# Patient Record
Sex: FEMALE | Race: WHITE | NOT HISPANIC OR LATINO | Employment: FULL TIME | URBAN - METROPOLITAN AREA
[De-identification: names, ages, dates, MRNs, and addresses within clinical notes are randomized per-mention and may not be internally consistent; named-entity substitution may affect disease eponyms.]

---

## 2021-01-22 ENCOUNTER — OFFICE VISIT (OUTPATIENT)
Dept: FAMILY MEDICINE | Facility: CLINIC | Age: 36
End: 2021-01-22
Payer: COMMERCIAL

## 2021-01-22 VITALS
DIASTOLIC BLOOD PRESSURE: 82 MMHG | OXYGEN SATURATION: 97 % | TEMPERATURE: 97.9 F | BODY MASS INDEX: 24.23 KG/M2 | SYSTOLIC BLOOD PRESSURE: 125 MMHG | HEIGHT: 67 IN | WEIGHT: 154.4 LBS | HEART RATE: 86 BPM | RESPIRATION RATE: 15 BRPM

## 2021-01-22 DIAGNOSIS — Z80.3 FAMILY HISTORY OF MALIGNANT NEOPLASM OF BREAST: ICD-10-CM

## 2021-01-22 DIAGNOSIS — Z00.00 ROUTINE GENERAL MEDICAL EXAMINATION AT A HEALTH CARE FACILITY: Primary | ICD-10-CM

## 2021-01-22 DIAGNOSIS — R30.0 DYSURIA: ICD-10-CM

## 2021-01-22 DIAGNOSIS — Z11.3 SCREEN FOR STD (SEXUALLY TRANSMITTED DISEASE): ICD-10-CM

## 2021-01-22 DIAGNOSIS — H91.93 DECREASED HEARING OF BOTH EARS: ICD-10-CM

## 2021-01-22 LAB
ALBUMIN UR-MCNC: NEGATIVE MG/DL
APPEARANCE UR: CLEAR
BILIRUB UR QL STRIP: NEGATIVE
COLOR UR AUTO: YELLOW
GLUCOSE UR STRIP-MCNC: NEGATIVE MG/DL
HCV AB SERPL QL IA: NONREACTIVE
HGB UR QL STRIP: ABNORMAL
HIV 1+2 AB+HIV1 P24 AG SERPL QL IA: NONREACTIVE
KETONES UR STRIP-MCNC: NEGATIVE MG/DL
LEUKOCYTE ESTERASE UR QL STRIP: NEGATIVE
NITRATE UR QL: NEGATIVE
NON-SQ EPI CELLS #/AREA URNS LPF: ABNORMAL /LPF
PH UR STRIP: 7 PH (ref 5–7)
RBC #/AREA URNS AUTO: ABNORMAL /HPF
SOURCE: ABNORMAL
SP GR UR STRIP: 1.01 (ref 1–1.03)
UROBILINOGEN UR STRIP-ACNC: 0.2 EU/DL (ref 0.2–1)
WBC #/AREA URNS AUTO: ABNORMAL /HPF

## 2021-01-22 PROCEDURE — 87591 N.GONORRHOEAE DNA AMP PROB: CPT | Performed by: NURSE PRACTITIONER

## 2021-01-22 PROCEDURE — 99385 PREV VISIT NEW AGE 18-39: CPT | Performed by: NURSE PRACTITIONER

## 2021-01-22 PROCEDURE — 86803 HEPATITIS C AB TEST: CPT | Performed by: NURSE PRACTITIONER

## 2021-01-22 PROCEDURE — 81001 URINALYSIS AUTO W/SCOPE: CPT | Performed by: NURSE PRACTITIONER

## 2021-01-22 PROCEDURE — 99214 OFFICE O/P EST MOD 30 MIN: CPT | Mod: 25 | Performed by: NURSE PRACTITIONER

## 2021-01-22 PROCEDURE — 87389 HIV-1 AG W/HIV-1&-2 AB AG IA: CPT | Performed by: NURSE PRACTITIONER

## 2021-01-22 PROCEDURE — 36415 COLL VENOUS BLD VENIPUNCTURE: CPT | Performed by: NURSE PRACTITIONER

## 2021-01-22 PROCEDURE — 87491 CHLMYD TRACH DNA AMP PROBE: CPT | Performed by: NURSE PRACTITIONER

## 2021-01-22 SDOH — HEALTH STABILITY: MENTAL HEALTH: HOW OFTEN DO YOU HAVE A DRINK CONTAINING ALCOHOL?: NEVER

## 2021-01-22 ASSESSMENT — ENCOUNTER SYMPTOMS
JOINT SWELLING: 0
DIZZINESS: 0
HEADACHES: 0
COUGH: 0
FREQUENCY: 1
HEMATURIA: 0
NERVOUS/ANXIOUS: 0
ARTHRALGIAS: 0
CONSTIPATION: 0
FEVER: 0
BREAST MASS: 0
HEMATOCHEZIA: 0
ABDOMINAL PAIN: 0
SORE THROAT: 0
CHILLS: 0
SHORTNESS OF BREATH: 0
NAUSEA: 0
HEARTBURN: 0
PALPITATIONS: 0
EYE PAIN: 0
WEAKNESS: 0
MYALGIAS: 0
PARESTHESIAS: 1
DIARRHEA: 0
DYSURIA: 0

## 2021-01-22 ASSESSMENT — MIFFLIN-ST. JEOR: SCORE: 1414.01

## 2021-01-22 NOTE — PROGRESS NOTES
SUBJECTIVE:   CC: Анна Alexander is an 35 year old woman who presents for preventive health visit.       Patient has been advised of split billing requirements and indicates understanding: Yes  Healthy Habits:     Getting at least 3 servings of Calcium per day:  Yes    Bi-annual eye exam:  Yes    Dental care twice a year:  Yes    Sleep apnea or symptoms of sleep apnea:  None    Diet:  Regular (no restrictions)    Frequency of exercise:  4-5 days/week    Duration of exercise:  15-30 minutes    Taking medications regularly:  Not Applicable    Medication side effects:  Not applicable    PHQ-2 Total Score: 0    Additional concerns today:  Yes    2. mom and mgm with early breast cancer  New right breast itching x 1 month    Referral for GYN, wants PAP, OCP consult and pelvic exam    Muffled hearing x 2-3 months - no pain or discharge.  Allergies?              Today's PHQ-2 Score:   PHQ-2 ( 1999 Pfizer) 1/22/2021   Q1: Little interest or pleasure in doing things 0   Q2: Feeling down, depressed or hopeless 0   PHQ-2 Score 0   Q1: Little interest or pleasure in doing things Not at all   Q2: Feeling down, depressed or hopeless Not at all   PHQ-2 Score 0       Abuse: Current or Past (Physical, Sexual or Emotional) - No  Do you feel safe in your environment? Yes        Social History     Tobacco Use     Smoking status: Never Smoker     Smokeless tobacco: Never Used   Substance Use Topics     Alcohol use: Never     Frequency: Never     If you drink alcohol do you typically have >3 drinks per day or >7 drinks per week? Yes      Alcohol Use 1/22/2021   Prescreen: >3 drinks/day or >7 drinks/week? Yes   AUDIT SCORE  7     AUDIT - Alcohol Use Disorders Identification Test - Reproduced from the World Health Organization Audit 2001 (Second Edition) 1/22/2021   1.  How often do you have a drink containing alcohol? 2 to 3 times a week   2.  How many drinks containing alcohol do you have on a typical day when you are drinking? 3 or 4    3.  How often do you have five or more drinks on one occasion? Weekly   4.  How often during the last year have you found that you were not able to stop drinking once you had started? Never   5.  How often during the last year have you failed to do what was normally expected of you because of drinking? Never   6.  How often during the last year have you needed a first drink in the morning to get yourself going after a heavy drinking session? Never   7.  How often during the last year have you had a feeling of guilt or remorse after drinking? Never   8.  How often during the last year have you been unable to remember what happened the night before because of your drinking? Never   9.  Have you or someone else been injured because of your drinking? No   10. Has a relative, friend, doctor or other health care worker been concerned about your drinking or suggested you cut down? No   TOTAL SCORE 7       Any new diagnosis of family breast, ovarian, or bowel cancer? No  - not new    Reviewed orders with patient.  Reviewed health maintenance and updated orders accordingly - Yes  Lab work is in process    Breast CA Risk Screening:  No flowsheet data found.    Mammogram Screening - Offered annual screening and updated Health Maintenance for mutual plan based on risk factor consideration    Pertinent mammograms are reviewed under the imaging tab.    History of abnormal Pap smear: NO - age 30-65 PAP every 5 years with negative HPV co-testing recommended     Reviewed and updated as needed this visit by clinical staff  Tobacco  Allergies  Meds   Med Hx  Surg Hx  Fam Hx  Soc Hx        Reviewed and updated as needed this visit by Provider                  Review of Systems   Constitutional: Negative for chills and fever.   HENT: Positive for hearing loss. Negative for congestion, ear pain and sore throat.    Eyes: Negative for pain and visual disturbance.   Respiratory: Negative for cough and shortness of breath.   "  Cardiovascular: Negative for chest pain, palpitations and peripheral edema.   Gastrointestinal: Negative for abdominal pain, constipation, diarrhea, heartburn, hematochezia and nausea.   Breasts:  Negative for tenderness, breast mass and discharge.   Genitourinary: Positive for frequency and urgency. Negative for dysuria, genital sores, hematuria, pelvic pain, vaginal bleeding and vaginal discharge.   Musculoskeletal: Negative for arthralgias, joint swelling and myalgias.   Skin: Negative for rash.   Neurological: Positive for paresthesias. Negative for dizziness, weakness and headaches.   Psychiatric/Behavioral: Negative for mood changes. The patient is not nervous/anxious.         OBJECTIVE:   /82 (BP Location: Left arm, Patient Position: Chair, Cuff Size: Adult Regular)   Pulse 86   Temp 97.9  F (36.6  C) (Tympanic)   Resp 15   Ht 1.695 m (5' 6.75\")   Wt 70 kg (154 lb 6.4 oz)   LMP 12/22/2020 (Approximate)   SpO2 97%   BMI 24.36 kg/m    Physical Exam  GENERAL: healthy, alert and no distress  EYES: Eyes grossly normal to inspection, PERRL and conjunctivae and sclerae normal  HENT: ear canals and TM's normal, nose and mouth without ulcers or lesions  NECK: no adenopathy, no asymmetry, masses, or scars and thyroid normal to palpation  RESP: lungs clear to auscultation - no rales, rhonchi or wheezes  CV: regular rate and rhythm, normal S1 S2, no S3 or S4, no murmur, click or rub, no peripheral edema and peripheral pulses strong  ABDOMEN: soft, nontender, no hepatosplenomegaly, no masses and bowel sounds normal  MS: no gross musculoskeletal defects noted, no edema  SKIN: no suspicious lesions or rashes  NEURO: Normal strength and tone, mentation intact and speech normal  PSYCH: mentation appears normal, affect normal/bright  Results for orders placed or performed in visit on 01/22/21   Hepatitis C antibody     Status: None   Result Value Ref Range    Hepatitis C Antibody Nonreactive NR^Nonreactive "   HIV Antigen Antibody Combo     Status: None   Result Value Ref Range    HIV Antigen Antibody Combo Nonreactive NR^Nonreactive       UA with Microscopic reflex to Culture     Status: Abnormal    Specimen: Midstream Urine   Result Value Ref Range    Color Urine Yellow     Appearance Urine Clear     Glucose Urine Negative NEG^Negative mg/dL    Bilirubin Urine Negative NEG^Negative    Ketones Urine Negative NEG^Negative mg/dL    Specific Gravity Urine 1.015 1.003 - 1.035    pH Urine 7.0 5.0 - 7.0 pH    Protein Albumin Urine Negative NEG^Negative mg/dL    Urobilinogen Urine 0.2 0.2 - 1.0 EU/dL    Nitrite Urine Negative NEG^Negative    Blood Urine Moderate (A) NEG^Negative    Leukocyte Esterase Urine Negative NEG^Negative    Source Midstream Urine     WBC Urine 0 - 5 OTO5^0 - 5 /HPF    RBC Urine 10-25 (A) OTO2^O - 2 /HPF    Squamous Epithelial /LPF Urine Few FEW^Few /LPF   NEISSERIA GONORRHOEA PCR     Status: None    Specimen: Urine   Result Value Ref Range    Specimen Descrip Urine     N Gonorrhea PCR Negative NEG^Negative   CHLAMYDIA TRACHOMATIS PCR     Status: None    Specimen: Urine   Result Value Ref Range    Specimen Description Urine     Chlamydia Trachomatis PCR Negative NEG^Negative         ASSESSMENT/PLAN:       ICD-10-CM    1. Routine general medical examination at a health care facility  Z00.00    2. Family history of malignant neoplasm of breast  Z80.3 *MA Screening Digital Bilateral     OB/GYN REFERRAL     OFFICE/OUTPT VISIT,EST,LEVL IV   3. Dysuria  R30.0 OFFICE/OUTPT VISIT,EST,LEVL IV     UA with Microscopic reflex to Culture   4. Decreased hearing of both ears  H91.93 OFFICE/OUTPT VISIT,EST,LEVL IV   5. Screen for STD (sexually transmitted disease)  Z11.3 NEISSERIA GONORRHOEA PCR     CHLAMYDIA TRACHOMATIS PCR     Hepatitis C antibody     HIV Antigen Antibody Combo      well female, not fasting for labs.  Encouraged to continue exercise and healthy diet choices.      Refer to GYN for pap, OCP options and  "family history of early breast cancer in mom and MGM.     Patient has been advised of split billing requirements and indicates understanding: Yes  COUNSELING:  Reviewed preventive health counseling, as reflected in patient instructions    Estimated body mass index is 24.36 kg/m  as calculated from the following:    Height as of this encounter: 1.695 m (5' 6.75\").    Weight as of this encounter: 70 kg (154 lb 6.4 oz).      She reports that she has never smoked. She has never used smokeless tobacco.      Counseling Resources:  ATP IV Guidelines  Pooled Cohorts Equation Calculator  Breast Cancer Risk Calculator  BRCA-Related Cancer Risk Assessment: FHS-7 Tool  FRAX Risk Assessment  ICSI Preventive Guidelines  Dietary Guidelines for Americans, 2010  USDA's MyPlate  ASA Prophylaxis  Lung CA Screening    MARCK [facility]  "

## 2021-01-23 LAB
C TRACH DNA SPEC QL NAA+PROBE: NEGATIVE
N GONORRHOEA DNA SPEC QL NAA+PROBE: NEGATIVE
SPECIMEN SOURCE: NORMAL
SPECIMEN SOURCE: NORMAL

## 2021-02-02 ENCOUNTER — ANCILLARY PROCEDURE (OUTPATIENT)
Dept: GENERAL RADIOLOGY | Facility: CLINIC | Age: 36
End: 2021-02-02
Attending: FAMILY MEDICINE
Payer: COMMERCIAL

## 2021-02-02 ENCOUNTER — ANCILLARY PROCEDURE (OUTPATIENT)
Dept: MAMMOGRAPHY | Facility: CLINIC | Age: 36
End: 2021-02-02
Attending: NURSE PRACTITIONER
Payer: COMMERCIAL

## 2021-02-02 ENCOUNTER — OFFICE VISIT (OUTPATIENT)
Dept: URGENT CARE | Facility: URGENT CARE | Age: 36
End: 2021-02-02
Payer: COMMERCIAL

## 2021-02-02 VITALS
DIASTOLIC BLOOD PRESSURE: 76 MMHG | TEMPERATURE: 98.2 F | WEIGHT: 155 LBS | OXYGEN SATURATION: 100 % | BODY MASS INDEX: 24.46 KG/M2 | HEART RATE: 91 BPM | SYSTOLIC BLOOD PRESSURE: 140 MMHG

## 2021-02-02 DIAGNOSIS — S39.92XA TAILBONE INJURY, INITIAL ENCOUNTER: Primary | ICD-10-CM

## 2021-02-02 DIAGNOSIS — Z80.3 FAMILY HISTORY OF MALIGNANT NEOPLASM OF BREAST: ICD-10-CM

## 2021-02-02 DIAGNOSIS — S39.92XA TAILBONE INJURY, INITIAL ENCOUNTER: ICD-10-CM

## 2021-02-02 PROCEDURE — 72220 X-RAY EXAM SACRUM TAILBONE: CPT | Performed by: RADIOLOGY

## 2021-02-02 PROCEDURE — 77067 SCR MAMMO BI INCL CAD: CPT | Mod: TC | Performed by: RADIOLOGY

## 2021-02-02 PROCEDURE — 77063 BREAST TOMOSYNTHESIS BI: CPT | Mod: TC | Performed by: RADIOLOGY

## 2021-02-02 PROCEDURE — 99213 OFFICE O/P EST LOW 20 MIN: CPT | Performed by: FAMILY MEDICINE

## 2021-02-02 RX ORDER — DICLOFENAC SODIUM 75 MG/1
75 TABLET, DELAYED RELEASE ORAL 2 TIMES DAILY
Qty: 14 TABLET | Refills: 0 | Status: SHIPPED | OUTPATIENT
Start: 2021-02-02 | End: 2022-03-22

## 2021-02-02 NOTE — PATIENT INSTRUCTIONS
Diclofenac 75 mg twice daily for the next week.  Take this even if you are not feeling pain.    Continue to ice the area.  Use arnica gel or arnica tablets orally.  If you can find lavender essential oil, you can also use that topically.

## 2021-02-02 NOTE — PROGRESS NOTES
Assessment & Plan     Tailbone injury, initial encounter.  No evidence of fracture per radiology reading.  No neuro complications.    Start diclofenac 75 mg BID x 7 days.  Use this to stay ahead of the pain.  Continue ice.  Can also use arnica topically or orally to help with recovery.    Limit high-impact exercise and squats for at least one week.    Follow up if not back to baseline in 2 weeks.    Sobia Booth MD  [facility] JENNIFER Jj is a 35 year old who presents to clinic today for evaluation of worsening tailbone pain after falling one week ago.  No change in bowel or bladder function.  Pain worse with changing from sitting to standing and with bending over.  Painful at night when lying down.  Has been using ice.  Has also tried ibuprofen 400 mg 1 dose per day as needed without much relief.      Objective    BP (!) 140/76   Pulse 91   Temp 98.2  F (36.8  C) (Tympanic)   Wt 70.3 kg (155 lb)   SpO2 100%   BMI 24.46 kg/m    Body mass index is 24.46 kg/m .   Well-appearing, sitting perched on edge of chair and leaning forward.      Results for orders placed or performed in visit on 02/02/21   XR Sacrum and Coccyx 2 Views     Status: None    Narrative    XR SACRUM AND COCCYX 2 VW 2/2/2021 10:13 AM     HISTORY: Fell 1 week ago, worsening tailbone pain.  r/o fracture.;  Tailbone injury, initial encounter      Impression    IMPRESSION: Iliac sclerosis adjacent to the sacral iliac joints which  may be degenerative. No apparent periarticular erosions indicate  sacroiliitis. Sacrum and coccyx appear grossly intact.    LINDA ROJAS MD

## 2021-02-11 ENCOUNTER — OFFICE VISIT (OUTPATIENT)
Dept: OBGYN | Facility: CLINIC | Age: 36
End: 2021-02-11
Payer: COMMERCIAL

## 2021-02-11 VITALS
SYSTOLIC BLOOD PRESSURE: 142 MMHG | HEIGHT: 67 IN | DIASTOLIC BLOOD PRESSURE: 78 MMHG | TEMPERATURE: 98 F | BODY MASS INDEX: 24.01 KG/M2 | HEART RATE: 97 BPM | WEIGHT: 153 LBS

## 2021-02-11 DIAGNOSIS — R35.0 URINARY FREQUENCY: ICD-10-CM

## 2021-02-11 DIAGNOSIS — Z30.017 NEXPLANON INSERTION: ICD-10-CM

## 2021-02-11 DIAGNOSIS — N39.3 SUI (STRESS URINARY INCONTINENCE, FEMALE): ICD-10-CM

## 2021-02-11 DIAGNOSIS — Z12.4 SCREENING FOR MALIGNANT NEOPLASM OF CERVIX: Primary | ICD-10-CM

## 2021-02-11 LAB — HCG UR QL: NEGATIVE

## 2021-02-11 PROCEDURE — G0145 SCR C/V CYTO,THINLAYER,RESCR: HCPCS | Performed by: OBSTETRICS & GYNECOLOGY

## 2021-02-11 PROCEDURE — 99202 OFFICE O/P NEW SF 15 MIN: CPT | Mod: 25 | Performed by: OBSTETRICS & GYNECOLOGY

## 2021-02-11 PROCEDURE — 81025 URINE PREGNANCY TEST: CPT | Performed by: OBSTETRICS & GYNECOLOGY

## 2021-02-11 PROCEDURE — 11981 INSERTION DRUG DLVR IMPLANT: CPT | Performed by: OBSTETRICS & GYNECOLOGY

## 2021-02-11 PROCEDURE — 87624 HPV HI-RISK TYP POOLED RSLT: CPT | Performed by: OBSTETRICS & GYNECOLOGY

## 2021-02-11 ASSESSMENT — MIFFLIN-ST. JEOR: SCORE: 1403.69

## 2021-02-11 NOTE — NURSING NOTE
"Chief Complaint   Patient presents with     Gyn Exam     Incontinence     nexplanon     NDC:6439-8891-97 LOT#:[id number] EXP:2022       Initial BP (!) 142/78   Pulse 97   Temp 98  F (36.7  C) (Oral)   Ht 1.689 m (5' 6.5\")   Wt 69.4 kg (153 lb)   LMP 2021   BMI 24.32 kg/m   Estimated body mass index is 24.32 kg/m  as calculated from the following:    Height as of this encounter: 1.689 m (5' 6.5\").    Weight as of this encounter: 69.4 kg (153 lb).  BP completed using cuff size: regular    Questioned patient about current smoking habits.  Pt. has never smoked.          The following HM Due: pap smear      The following patient reported/Care Every where data was sent to:  P ABSTRACT QUALITY INITIATIVES [[id number]]        patient has appointment for today              "

## 2021-02-11 NOTE — PROGRESS NOTES
GYN Clinic Visit  Date of visit: 2021   Chief Complaint: pap, incontinence, contraception    HPI:   Анна Alexander is a 35 year old  female who presents today to discuss stress urinary incontinence and contraception. Additionally she needs a pap.    Urinary incontinence with jumping. When she goes for a run or do a work out she feels like she needs to go the bathroom as soon as she starts the work out. Has been recommended to see pelvic floor PT in the past but has not gone. Does kegels but not sure if they are helping.    Interested in contraception. Has used OCPs in the past. Discussed options, she is interested in Nexplanon today. Patient's last menstrual period was 2021.     Obstetric History:   OB History    Para Term  AB Living   2 2 2 0 0 2   SAB TAB Ectopic Multiple Live Births   0 0 0 0 2      # Outcome Date GA Lbr Eugenio/2nd Weight Sex Delivery Anes PTL Lv   2 Term     F Vag-Spont   ANTHONY   1 Term     M Vag-Spont   ANTHONY      x2    Past Medical History:  History reviewed. No pertinent past medical history.    Past Surgical History:  History reviewed. No pertinent surgical history.      Medications:  Current Outpatient Medications   Medication     diclofenac (VOLTAREN) 75 MG EC tablet     No current facility-administered medications for this visit.        Allergy:  Allergies   Allergen Reactions     Cetirizine      Patient denies food, latex or environmental allergies.     Social History:  Lives with 2 kids. Dating.   Social History     Tobacco Use     Smoking status: Never Smoker     Smokeless tobacco: Never Used   Substance Use Topics     Alcohol use: Never     Frequency: Never     Drug use: Never        Family History   Problem Relation Age of Onset     Breast Cancer Mother 55     Cardiovascular Mother         bicuspid aortic valve     Hypertension Father      Breast Cancer Maternal Grandmother          Physical Exam:  Vitals:    21 1313   BP: (!) 142/78   Pulse: 97  "  Temp: 98  F (36.7  C)   TempSrc: Oral   Weight: 69.4 kg (153 lb)   Height: 1.689 m (5' 6.5\")     Body mass index is 24.32 kg/m .    Gen: healthy, alert, active, no distress  CV: normal pulses, no edema  Resp: normal respiratory effort  :   - external genitalia: vulva and perineum are normal without lesion, mass or erythema  - urethra: well supported urethra, no hypermobility, normal Skenes and Bartholins.   - bladder: no tenderness, no masses  - vagina: intact, rugated mucosa without lesions or abnormal discharge. noprolapse.   - cervix: normal, no lesions or abnormal discharge. Pap smear obtained.   - uterus: normal size anteverted, no masses or tenderness  - adnexa: no masses or tenderness  - rectal: deferred       Nexplanon Insertion  Before insertion it was confirmed that Анна Alexander is not pregnant nor has any other contraindication for the use of Nexplanon (no known or suspected pregnancy, no current or past history of thrombosis or thromboembolic disorders, no liver tumors, no undiagnosed abnormal genital bleeding, no known or suspected breast cancer or progestin-sensitive cancer, and no allergic reaction to any components of nexplanon.)      She understands the benefits and risks of Nexplanon.  She is explained the most common adverse reactions reported in clinical trials were change in menstrual bleeding pattern, headache, vaginitis, weight increase, acne, breast pain, abdominal pain, and pharyngitis. The patient has received a copy of the Patient Labeling included in packaging. Consent and been reviewed and completed. Patient has no allergies to the antiseptic and anesthetic to be used during insertion.      A single NEXPLANON implant was inserted subdermally in the upper left side arm.     The patient lied on her back on the exam table with her non-dominant arm flexed at the elbow and externally rotated with wrist parallel to her ear. The insertion site was identified by measuring at the inner side " of the non-dominant upper arm about 8-10cm above the medial epicondyle of the humerus. Large visible blood vessels were noted and avoided. The insertion site was cleansed with betadine.  The insertion area was anesthetized with 3 mL of  1% lidocaine.  The skin was stretched at insertion site and the nexplanon applicator inserted at 30 degrees.  The position of the implant was confirmed immediately after insertion by palpation.  Bandage placed over implant site.  Анна Alexander was able to palpate the implant herself.  Pressure bandage placed with a sterile guaze to minimize bruising.  The patient was instructed to remove the pressure dressing in 24 hours and place a normal bandage over the insertion site for 3-5 days post-insertion.  The patient tolerated the procedure well.      NDC:5889-9059-68 LOT#:[id number] EXP:2022      Assessment:  Анна Alexander is a 35 year old  who presents today to discuss stress urinary incontinence, contraception and cervical cancer screening. nexplanon inserted for contraception.    1. Screening for malignant neoplasm of cervix  - Pap imaged thin layer screen with HPV - recommended age 30 - 65 years (select HPV order below)  - HPV High Risk Types DNA Cervical    2. RANDI (stress urinary incontinence, female)  - [facility] PT, HAND, AND CHIROPRACTIC REFERRAL; Future    3. Urinary frequency  - [facility] PT, HAND, AND CHIROPRACTIC REFERRAL; Future    4. Nexplanon insertion  - HCG Qual, Urine ([id number])  - etonogestrel (NEXPLANON) subdermal implant 68 mg  - INSERTION NON-BIODEGRADABLE DRUG DELIVERY IMPLANT      Zara Borjas MD

## 2021-02-15 LAB
COPATH REPORT: NORMAL
PAP: NORMAL

## 2021-02-17 ENCOUNTER — TELEPHONE (OUTPATIENT)
Dept: OBGYN | Facility: CLINIC | Age: 36
End: 2021-02-17

## 2021-02-17 LAB
FINAL DIAGNOSIS: ABNORMAL
HPV HR 12 DNA CVX QL NAA+PROBE: POSITIVE
HPV16 DNA SPEC QL NAA+PROBE: NEGATIVE
HPV18 DNA SPEC QL NAA+PROBE: NEGATIVE
SPECIMEN DESCRIPTION: ABNORMAL
SPECIMEN SOURCE CVX/VAG CYTO: ABNORMAL

## 2021-02-17 NOTE — TELEPHONE ENCOUNTER
Patient had a Nexplanon placed on 2/11. Her arm is still feeling very sore. The insertion site itself looks fine according to paient-does not look infected. Patient does feel like the bar itself looks visible under her skin, almost like it is trying to come out of her skin. Still having a lot of bruising around the area. She is leaving for out of town tomorrow. Would you recommend she come in to have it looked at? Fanny Saab RN

## 2021-02-17 NOTE — TELEPHONE ENCOUNTER
TC to patient. She is not able to come in today. She will try ice and ibuprofen to see if that helps. Appointment scheduled with Dr. Tse tomorrow at 9 am if needed. Fanny Saab RN'

## 2021-02-18 ENCOUNTER — OFFICE VISIT (OUTPATIENT)
Dept: OBGYN | Facility: CLINIC | Age: 36
End: 2021-02-18
Payer: COMMERCIAL

## 2021-02-18 ENCOUNTER — PATIENT OUTREACH (OUTPATIENT)
Dept: OBGYN | Facility: CLINIC | Age: 36
End: 2021-02-18

## 2021-02-18 VITALS
HEIGHT: 67 IN | DIASTOLIC BLOOD PRESSURE: 84 MMHG | WEIGHT: 153 LBS | BODY MASS INDEX: 24.01 KG/M2 | SYSTOLIC BLOOD PRESSURE: 120 MMHG

## 2021-02-18 DIAGNOSIS — Z30.46 CHECKING SUBDERMAL CONTRACEPTIVE: Primary | ICD-10-CM

## 2021-02-18 PROCEDURE — 99212 OFFICE O/P EST SF 10 MIN: CPT | Performed by: OBSTETRICS & GYNECOLOGY

## 2021-02-18 ASSESSMENT — MIFFLIN-ST. JEOR: SCORE: 1403.69

## 2021-02-18 NOTE — PROGRESS NOTES
"CC:  nexplanon check  HPI:  Анна Alexander is a 35 year old female Patient's last menstrual period was 01/24/2021.  Had Nexplanon placed February 11 and lot of bruising noted.  Called yesterday as she is still having pain at site of implantation.  This pain did get better with ibuprofen and applying ice.  She just wanted to recheck and make sure it is okay.    EXAM:  Blood pressure 120/84, height 1.689 m (5' 6.5\"), weight 69.4 kg (153 lb), last menstrual period 01/24/2021.  General - pleasant female in no acute distress.  Left arm-Nexplanon can be felt under the skin in place and the skin edges are healed with some bruising surrounding  Psychiactric - appropriate mood and affect  Neurological - alert and oriented X 3      ASSESSMENT/PLAN:  (Z30.46) Checking subdermal contraceptive  (primary encounter diagnosis)  Comment: Nexplanon  Plan: Site is healing well and Nexplanon is in place as expected.  Discussed applying Arnica cream to help with bruising.  No questions      Penelope Tse MD    "

## 2021-10-11 ENCOUNTER — HEALTH MAINTENANCE LETTER (OUTPATIENT)
Age: 36
End: 2021-10-11

## 2022-01-27 ENCOUNTER — PATIENT OUTREACH (OUTPATIENT)
Dept: FAMILY MEDICINE | Facility: CLINIC | Age: 37
End: 2022-01-27
Payer: COMMERCIAL

## 2022-01-27 DIAGNOSIS — R87.810 CERVICAL HIGH RISK HPV (HUMAN PAPILLOMAVIRUS) TEST POSITIVE: ICD-10-CM

## 2022-01-27 NOTE — LETTER
April 18, 2022      Анна Alexander  [address] [place] [address] [address]  [place] [address]        Dear ,    This letter is to remind you that you are due for your follow-up Pap smear and Human Papillomavirus (HPV) test.    Please call [phone] to schedule your appointment at your earliest convenience.    If you have completed the appointment outside of the [facility] system, please have the records forwarded to our office. We will update your chart for your provider to review before your next annual wellness visit.     Thank you for choosing [facility]!      Sincerely,    Your [facility] Care Team

## 2022-01-27 NOTE — TELEPHONE ENCOUNTER
03/22/22 physical scheduled   Internal Medicine Internal Medicine Internal Medicine Internal Medicine Internal Medicine Nephrology Nephrology Nephrology Nephrology Surgery Internal Medicine

## 2022-02-28 ENCOUNTER — TELEPHONE (OUTPATIENT)
Dept: URGENT CARE | Facility: URGENT CARE | Age: 37
End: 2022-02-28
Payer: COMMERCIAL

## 2022-02-28 DIAGNOSIS — Z80.3 FAMILY HISTORY OF MALIGNANT NEOPLASM OF BREAST: ICD-10-CM

## 2022-02-28 DIAGNOSIS — Z12.31 VISIT FOR SCREENING MAMMOGRAM: Primary | ICD-10-CM

## 2022-02-28 NOTE — TELEPHONE ENCOUNTER
Hi Анна Zuñiga is on the mammo schedule for this Friday, March 4th, without orders.    She is under 40 years old so we need the Provider to place and sign the order.    Her last mammo was done here at [facility] on 02/02/2021 and was read as negatvie.    Would you please place orders for her to have a mammogram?    Could you please let me know if you will not be signing the order?    Thank you,    Pratima MEDRANO (R)(M)  [facility]  [phone]

## 2022-03-04 ENCOUNTER — ANCILLARY PROCEDURE (OUTPATIENT)
Dept: MAMMOGRAPHY | Facility: CLINIC | Age: 37
End: 2022-03-04
Payer: COMMERCIAL

## 2022-03-04 DIAGNOSIS — Z12.31 VISIT FOR SCREENING MAMMOGRAM: ICD-10-CM

## 2022-03-04 DIAGNOSIS — Z80.3 FAMILY HISTORY OF MALIGNANT NEOPLASM OF BREAST: ICD-10-CM

## 2022-03-04 PROCEDURE — 77063 BREAST TOMOSYNTHESIS BI: CPT | Mod: TC | Performed by: RADIOLOGY

## 2022-03-04 PROCEDURE — 77067 SCR MAMMO BI INCL CAD: CPT | Mod: TC | Performed by: RADIOLOGY

## 2022-03-04 NOTE — TELEPHONE ENCOUNTER
ASSESSMENT / PLAN:  (Z12.31) Visit for screening mammogram  (primary encounter diagnosis)  Comment:   Plan: *MA Screening Digital Bilateral            (Z80.3) Family history of malignant neoplasm of breast  Comment:   Plan: *MA Screening Digital Bilateral          Family History   Problem Relation Age of Onset     Breast Cancer Mother 55     Cardiovascular Mother         bicuspid aortic valve     Hypertension Father      Breast Cancer Maternal Grandmother

## 2022-03-04 NOTE — TELEPHONE ENCOUNTER
Zara with Imaging calling in regarding message below. Needs orders for mammo as pt under 40 years old.     Old PCP was KRISHNA Navarro, last seen Jan 2021.     DOD, willing to sign off on this? Patient is scheduled for mammo at 11AM today.       If order gets signed, we don't need to call Zara with imaging back, if there are any issues, we will need to give her a call. OK to leave detailed message at [phone]    TIESHA Schaeffer RN  [facility]

## 2022-03-27 ENCOUNTER — HEALTH MAINTENANCE LETTER (OUTPATIENT)
Age: 37
End: 2022-03-27

## 2022-06-17 NOTE — TELEPHONE ENCOUNTER
FYI to provider - Patient is lost to pap tracking follow-up. Attempts to contact pt have been made per reminder process and there has been no reply and/or no appt scheduled. Contact hx listed below.     02/11/21 NIL Pap, + HR HPV (not 16 or 18) Plan cotest in 1 year due 02/11/22.  02/18/21 Phone attempt to contact pt, no answer, recieved a message that voice mail box not set up yet. I released results and recommendations to the pt through [employer].  02/19/21 Phone attempt to contact pt, no answer, recieved a message that voice mail box not set up yet. Letter sent   03/22/22 physical scheduled - no show  4/18/22 Reminder letter  5/16/22 Reminder call - spoke with patient  6/17/22 Lost to follow-up for pap tracking

## 2022-09-25 ENCOUNTER — HEALTH MAINTENANCE LETTER (OUTPATIENT)
Age: 37
End: 2022-09-25

## 2023-02-17 SDOH — ECONOMIC STABILITY: FOOD INSECURITY: WITHIN THE PAST 12 MONTHS, YOU WORRIED THAT YOUR FOOD WOULD RUN OUT BEFORE YOU GOT MONEY TO BUY MORE.: NEVER TRUE

## 2023-02-17 SDOH — ECONOMIC STABILITY: TRANSPORTATION INSECURITY
IN THE PAST 12 MONTHS, HAS LACK OF TRANSPORTATION KEPT YOU FROM MEETINGS, WORK, OR FROM GETTING THINGS NEEDED FOR DAILY LIVING?: NO

## 2023-02-17 SDOH — HEALTH STABILITY: PHYSICAL HEALTH: ON AVERAGE, HOW MANY DAYS PER WEEK DO YOU ENGAGE IN MODERATE TO STRENUOUS EXERCISE (LIKE A BRISK WALK)?: 4 DAYS

## 2023-02-17 SDOH — ECONOMIC STABILITY: INCOME INSECURITY: IN THE LAST 12 MONTHS, WAS THERE A TIME WHEN YOU WERE NOT ABLE TO PAY THE MORTGAGE OR RENT ON TIME?: NO

## 2023-02-17 SDOH — ECONOMIC STABILITY: INCOME INSECURITY: HOW HARD IS IT FOR YOU TO PAY FOR THE VERY BASICS LIKE FOOD, HOUSING, MEDICAL CARE, AND HEATING?: NOT HARD AT ALL

## 2023-02-17 SDOH — HEALTH STABILITY: PHYSICAL HEALTH: ON AVERAGE, HOW MANY MINUTES DO YOU ENGAGE IN EXERCISE AT THIS LEVEL?: 20 MIN

## 2023-02-17 SDOH — ECONOMIC STABILITY: TRANSPORTATION INSECURITY
IN THE PAST 12 MONTHS, HAS THE LACK OF TRANSPORTATION KEPT YOU FROM MEDICAL APPOINTMENTS OR FROM GETTING MEDICATIONS?: NO

## 2023-02-17 SDOH — ECONOMIC STABILITY: FOOD INSECURITY: WITHIN THE PAST 12 MONTHS, THE FOOD YOU BOUGHT JUST DIDN'T LAST AND YOU DIDN'T HAVE MONEY TO GET MORE.: NEVER TRUE

## 2023-02-17 ASSESSMENT — ENCOUNTER SYMPTOMS
BREAST MASS: 0
DIZZINESS: 0
NERVOUS/ANXIOUS: 0
HEMATURIA: 0
NAUSEA: 0
FREQUENCY: 1
HEMATOCHEZIA: 0
CONSTIPATION: 0
FEVER: 0
DYSURIA: 0
WEAKNESS: 0
MYALGIAS: 0
COUGH: 0
ABDOMINAL PAIN: 0
HEARTBURN: 0
PARESTHESIAS: 1
HEADACHES: 0
EYE PAIN: 0
CHILLS: 0
SHORTNESS OF BREATH: 0
JOINT SWELLING: 0
ARTHRALGIAS: 0
DIARRHEA: 0
PALPITATIONS: 0
SORE THROAT: 0

## 2023-02-17 ASSESSMENT — LIFESTYLE VARIABLES
HOW OFTEN DO YOU HAVE SIX OR MORE DRINKS ON ONE OCCASION: MONTHLY
HOW OFTEN DO YOU HAVE A DRINK CONTAINING ALCOHOL: 4 OR MORE TIMES A WEEK
AUDIT-C TOTAL SCORE: 6
SKIP TO QUESTIONS 9-10: 0
HOW MANY STANDARD DRINKS CONTAINING ALCOHOL DO YOU HAVE ON A TYPICAL DAY: 1 OR 2

## 2023-02-17 ASSESSMENT — SOCIAL DETERMINANTS OF HEALTH (SDOH)
DO YOU BELONG TO ANY CLUBS OR ORGANIZATIONS SUCH AS CHURCH GROUPS UNIONS, FRATERNAL OR ATHLETIC GROUPS, OR SCHOOL GROUPS?: YES
HOW OFTEN DO YOU ATTEND CHURCH OR RELIGIOUS SERVICES?: MORE THAN 4 TIMES PER YEAR
HOW OFTEN DO YOU GET TOGETHER WITH FRIENDS OR RELATIVES?: MORE THAN THREE TIMES A WEEK
IN A TYPICAL WEEK, HOW MANY TIMES DO YOU TALK ON THE PHONE WITH FAMILY, FRIENDS, OR NEIGHBORS?: MORE THAN THREE TIMES A WEEK

## 2023-02-21 ENCOUNTER — OFFICE VISIT (OUTPATIENT)
Dept: PEDIATRICS | Facility: CLINIC | Age: 38
End: 2023-02-21
Payer: COMMERCIAL

## 2023-02-21 VITALS
TEMPERATURE: 97.7 F | OXYGEN SATURATION: 100 % | WEIGHT: 169.3 LBS | DIASTOLIC BLOOD PRESSURE: 87 MMHG | HEART RATE: 99 BPM | SYSTOLIC BLOOD PRESSURE: 121 MMHG | HEIGHT: 67 IN | BODY MASS INDEX: 26.57 KG/M2 | RESPIRATION RATE: 16 BRPM

## 2023-02-21 DIAGNOSIS — Z12.4 CERVICAL CANCER SCREENING: ICD-10-CM

## 2023-02-21 DIAGNOSIS — Z00.00 ROUTINE GENERAL MEDICAL EXAMINATION AT A HEALTH CARE FACILITY: Primary | ICD-10-CM

## 2023-02-21 DIAGNOSIS — H93.8X3 POPPING OF BOTH EARS: ICD-10-CM

## 2023-02-21 DIAGNOSIS — L23.9 CUTANEOUS HYPERSENSITIVITY: ICD-10-CM

## 2023-02-21 DIAGNOSIS — Z13.220 LIPID SCREENING: ICD-10-CM

## 2023-02-21 DIAGNOSIS — Z80.3 FAMILY HISTORY OF MALIGNANT NEOPLASM OF BREAST: ICD-10-CM

## 2023-02-21 DIAGNOSIS — L72.3 SEBACEOUS CYST: ICD-10-CM

## 2023-02-21 DIAGNOSIS — Z23 NEED FOR TDAP VACCINATION: ICD-10-CM

## 2023-02-21 DIAGNOSIS — L65.9 HAIR LOSS: ICD-10-CM

## 2023-02-21 DIAGNOSIS — N90.7 LABIAL CYST: ICD-10-CM

## 2023-02-21 DIAGNOSIS — N64.4 BREAST PAIN, LEFT: ICD-10-CM

## 2023-02-21 DIAGNOSIS — Z13.1 SCREENING FOR DIABETES MELLITUS: ICD-10-CM

## 2023-02-21 LAB
CHOLEST SERPL-MCNC: 209 MG/DL
ERYTHROCYTE [DISTWIDTH] IN BLOOD BY AUTOMATED COUNT: 13.4 % (ref 10–15)
HBA1C MFR BLD: 5.1 % (ref 0–5.6)
HCT VFR BLD AUTO: 42 % (ref 35–47)
HDLC SERPL-MCNC: 96 MG/DL
HGB BLD-MCNC: 13.9 G/DL (ref 11.7–15.7)
LDLC SERPL CALC-MCNC: 101 MG/DL
MCH RBC QN AUTO: 29.9 PG (ref 26.5–33)
MCHC RBC AUTO-ENTMCNC: 33.1 G/DL (ref 31.5–36.5)
MCV RBC AUTO: 90 FL (ref 78–100)
NONHDLC SERPL-MCNC: 113 MG/DL
PLATELET # BLD AUTO: 196 10E3/UL (ref 150–450)
RBC # BLD AUTO: 4.65 10E6/UL (ref 3.8–5.2)
TRIGL SERPL-MCNC: 60 MG/DL
TSH SERPL DL<=0.005 MIU/L-ACNC: 1.4 UIU/ML (ref 0.3–4.2)
WBC # BLD AUTO: 6.9 10E3/UL (ref 4–11)

## 2023-02-21 PROCEDURE — 87624 HPV HI-RISK TYP POOLED RSLT: CPT | Performed by: PHYSICIAN ASSISTANT

## 2023-02-21 PROCEDURE — 99395 PREV VISIT EST AGE 18-39: CPT | Mod: 25 | Performed by: PHYSICIAN ASSISTANT

## 2023-02-21 PROCEDURE — G0145 SCR C/V CYTO,THINLAYER,RESCR: HCPCS | Performed by: PHYSICIAN ASSISTANT

## 2023-02-21 PROCEDURE — 99214 OFFICE O/P EST MOD 30 MIN: CPT | Mod: 25 | Performed by: PHYSICIAN ASSISTANT

## 2023-02-21 PROCEDURE — 84443 ASSAY THYROID STIM HORMONE: CPT | Performed by: PHYSICIAN ASSISTANT

## 2023-02-21 PROCEDURE — 80061 LIPID PANEL: CPT | Performed by: PHYSICIAN ASSISTANT

## 2023-02-21 PROCEDURE — 90715 TDAP VACCINE 7 YRS/> IM: CPT | Performed by: PHYSICIAN ASSISTANT

## 2023-02-21 PROCEDURE — 90471 IMMUNIZATION ADMIN: CPT | Performed by: PHYSICIAN ASSISTANT

## 2023-02-21 PROCEDURE — 83036 HEMOGLOBIN GLYCOSYLATED A1C: CPT | Performed by: PHYSICIAN ASSISTANT

## 2023-02-21 PROCEDURE — 85027 COMPLETE CBC AUTOMATED: CPT | Performed by: PHYSICIAN ASSISTANT

## 2023-02-21 PROCEDURE — 36415 COLL VENOUS BLD VENIPUNCTURE: CPT | Performed by: PHYSICIAN ASSISTANT

## 2023-02-21 RX ORDER — ETONOGESTREL 68 MG/1
1 IMPLANT SUBCUTANEOUS ONCE
COMMUNITY
End: 2024-04-29

## 2023-02-21 ASSESSMENT — ENCOUNTER SYMPTOMS
SHORTNESS OF BREATH: 0
COUGH: 0
HEMATOCHEZIA: 0
HEMATURIA: 0
NAUSEA: 0
HEADACHES: 0
MYALGIAS: 0
DIZZINESS: 0
FREQUENCY: 1
NERVOUS/ANXIOUS: 0
CHILLS: 0
SORE THROAT: 0
CONSTIPATION: 0
PALPITATIONS: 0
DIARRHEA: 0
FEVER: 0
PARESTHESIAS: 1
DYSURIA: 0
ARTHRALGIAS: 0
BREAST MASS: 0
ABDOMINAL PAIN: 0
WEAKNESS: 0
EYE PAIN: 0
JOINT SWELLING: 0
HEARTBURN: 0

## 2023-02-21 ASSESSMENT — PAIN SCALES - GENERAL: PAINLEVEL: MODERATE PAIN (4)

## 2023-02-21 NOTE — PROGRESS NOTES
SUBJECTIVE:   CC: Анна is an 37 year old who presents for preventive health visit.     Patient has been advised of split billing requirements and indicates understanding: Yes     Healthy Habits:     Getting at least 3 servings of Calcium per day:  Yes    Bi-annual eye exam:  Yes    Dental care twice a year:  Yes    Sleep apnea or symptoms of sleep apnea:  None    Diet:  Regular (no restrictions)    Frequency of exercise:  1 day/week    Duration of exercise:  15-30 minutes    Taking medications regularly:  Yes    Medication side effects:  Not applicable    PHQ-2 Total Score: 0    Additional concerns today:  No      1. Left breast soreness: noted for aprox two months. Not associated with menses. No lump. No redness. Comes and goes. No nipple discharge. Has family hx of breast cancer. 3/4/22 had normal mammogram. No injury. No rash.     2.  Urinary urgency. Bothers her with running. Would like to do pelvic floor PT. Was supposed to go prior to covid. No dysuria, hematuria, constipation. Has tried Kegel exercise    3. Possible small bump labia. No pain. Not open. Grows some. History of similar in past. Warm pack and had discharge that came out of a previous one, did not work for this.    4.  Ear pain, noted. Feels like under water, hearing test is normal in past. Feels she needs to pop her ears often. Having muffled hearing. No ringing or discharge. Tried wax clearing, no help. Has not seen ENT. No hx of recurrent ear infections. No allergy symptoms. No balance concerns. No dizziness associated.    5. With back pain, had tingling numbness, this has resolved. Now skin seems hypersensitive. Comes and goes. Can be in arms and legs. Noted over last few months. No rash associated. No weakness. Intermittent sxs.    6. Notes hair loss has increased. Bothersome to her. She is not clear as to why. Seems to be worsening.  Did have hx of covid. May 2022    7. Cyst on back. No pain. Noted recently. No discharge. No tx.            Today's PHQ-2 Score:   PHQ-2 ( 1999 Pf[employer]) 2/17/2023   Q1: Little interest or pleasure in doing things 0   Q2: Feeling down, depressed or hopeless 0   PHQ-2 Score 0   PHQ-2 Total Score (12-17 Years)- Positive if 3 or more points; Administer PHQ-A if positive -   Q1: Little interest or pleasure in doing things Not at all   Q2: Feeling down, depressed or hopeless Not at all   PHQ-2 Score 0       Have you ever done Advance Care Planning? (For example, a Health Directive, POLST, or a discussion with a medical provider or your loved ones about your wishes): Yes, patient states has an Advance Care Planning document and will bring a copy to the clinic.    Social History     Tobacco Use     Smoking status: Never     Smokeless tobacco: Never   Substance Use Topics     Alcohol use: Never       Alcohol Use 2/17/2023   Prescreen: >3 drinks/day or >7 drinks/week? Yes   Prescreen: >3 drinks/day or >7 drinks/week? -   AUDIT SCORE  6       Reviewed orders with patient.  Reviewed health maintenance and updated orders accordingly - Yes  Lab work is in process    Breast Cancer Screening:    FHS-7:   Breast CA Risk Assessment (FHS-7) 3/4/2022 2/17/2023   Did any of your first-degree relatives have breast or ovarian cancer? Yes Yes   Did any of your relatives have bilateral breast cancer? No Unknown   Did any man in your family have breast cancer? No No   Did any woman in your family have breast and ovarian cancer? No Yes   Did any woman in your family have breast cancer before age 50 y? No Yes   Do you have 2 or more relatives with breast and/or ovarian cancer? No Yes   Do you have 2 or more relatives with breast and/or bowel cancer? Yes Yes     click delete button to remove this line now  Mammogram Screening - Offered annual screening and updated Health Maintenance for mutual plan based on risk factor consideration    Pertinent mammograms are reviewed under the imaging tab.    History of abnormal Pap smear  PAP / HPV Latest  "Ref Rng & Units 2/11/2021   PAP (Historical) - NIL   HPV16 NEG:Negative Negative   HPV18 NEG:Negative Negative   HRHPV NEG:Negative Positive(A)     Reviewed and updated as needed this visit by clinical staff   Tobacco  Allergies  Meds    Surg Hx  Fam Hx          Reviewed and updated as needed this visit by Provider   Tobacco      Surg Hx  Fam Hx             Review of Systems   Constitutional: Negative for chills and fever.   HENT: Positive for ear pain and hearing loss. Negative for congestion and sore throat.    Eyes: Negative for pain and visual disturbance.   Respiratory: Negative for cough and shortness of breath.    Cardiovascular: Negative for chest pain, palpitations and peripheral edema.   Gastrointestinal: Negative for abdominal pain, constipation, diarrhea, heartburn, hematochezia and nausea.   Breasts:  Positive for tenderness. Negative for breast mass and discharge.   Genitourinary: Positive for frequency and genital sores. Negative for dysuria, hematuria, pelvic pain, urgency, vaginal bleeding and vaginal discharge.   Musculoskeletal: Negative for arthralgias, joint swelling and myalgias.   Skin: Negative for rash.   Neurological: Positive for paresthesias. Negative for dizziness, weakness and headaches.   Psychiatric/Behavioral: Negative for mood changes. The patient is not nervous/anxious.           OBJECTIVE:   /87   Pulse 99   Temp 97.7  F (36.5  C) (Oral)   Resp 16   Ht 1.702 m (5' 7\")   Wt 76.8 kg (169 lb 4.8 oz)   SpO2 100%   BMI 26.52 kg/m    Physical Exam  GENERAL: healthy, alert and no distress  EYES: Eyes grossly normal to inspection, PERRL and conjunctivae and sclerae normal  HENT: ear canals and TM's normal, nose and mouth without ulcers or lesions  NECK: no adenopathy, no asymmetry, masses, or scars and thyroid normal to palpation  RESP: lungs clear to auscultation - no rales, rhonchi or wheezes  BREAST: normal without masses, tenderness or nipple discharge and no " palpable axillary masses or adenopathy  CV: regular rate and rhythm, normal S1 S2, no S3 or S4, no murmur, click or rub, no peripheral edema and peripheral pulses strong  ABDOMEN: soft, nontender, no hepatosplenomegaly, no masses and bowel sounds normal   (female): normal female external genitalia, normal urethral meatus , vaginal mucosa pink, moist, well rugated, normal cervix, adnexae, and uterus without masses. and small 2mm labial cyst right side noted.   MS: no gross musculoskeletal defects noted, no edema  SKIN: left flank area, 2mm sebaceous cyst noted. Not tender, no fluctuance. Small amount of debris was removed, w/o complications.   NEURO: Normal strength and tone, mentation intact and speech normal  PSYCH: mentation appears normal, affect normal/bright  LYMPH: no cervical, supraclavicular, axillary, or inguinal adenopathy    Diagnostic Test Results:  Labs reviewed in Epic  Results for orders placed or performed in visit on 02/21/23   TSH     Status: Normal   Result Value Ref Range    TSH 1.40 0.30 - 4.20 uIU/mL   CBC with platelets     Status: Normal   Result Value Ref Range    WBC Count 6.9 4.0 - 11.0 10e3/uL    RBC Count 4.65 3.80 - 5.20 10e6/uL    Hemoglobin 13.9 11.7 - 15.7 g/dL    Hematocrit 42.0 35.0 - 47.0 %    MCV 90 78 - 100 fL    MCH 29.9 26.5 - 33.0 pg    MCHC 33.1 31.5 - 36.5 g/dL    RDW 13.4 10.0 - 15.0 %    Platelet Count 196 150 - 450 10e3/uL   Hemoglobin A1c     Status: Normal   Result Value Ref Range    Hemoglobin A1C 5.1 0.0 - 5.6 %   Lipid panel reflex to direct LDL Fasting     Status: Abnormal   Result Value Ref Range    Cholesterol 209 (H) <200 mg/dL    Triglycerides 60 <150 mg/dL    Direct Measure HDL 96 >=50 mg/dL    LDL Cholesterol Calculated 101 (H) <=100 mg/dL    Non HDL Cholesterol 113 <130 mg/dL    Narrative    Cholesterol  Desirable:  <200 mg/dL    Triglycerides  Normal:  Less than 150 mg/dL  Borderline High:  150-199 mg/dL  High:  200-499 mg/dL  Very High:  Greater than or  equal to 500 mg/dL    Direct Measure HDL  Female:  Greater than or equal to 50 mg/dL   Male:  Greater than or equal to 40 mg/dL    LDL Cholesterol  Desirable:  <100mg/dL  Above Desirable:  100-129 mg/dL   Borderline High:  130-159 mg/dL   High:  160-189 mg/dL   Very High:  >= 190 mg/dL    Non HDL Cholesterol  Desirable:  130 mg/dL  Above Desirable:  130-159 mg/dL  Borderline High:  160-189 mg/dL  High:  190-219 mg/dL  Very High:  Greater than or equal to 220 mg/dL       ASSESSMENT/PLAN:      Diagnosis Comments   1. Routine general medical examination at a health care facility  REVIEW OF HEALTH MAINTENANCE PROTOCOL ORDERS   Annual exam  Healthy lifestyle modifications.   Increase movement for exercise.         2. Hair loss  TSH, CBC with platelets         3. Breast pain, left  Will watch at this time. No findings. Hx of normal exam, mammogram. Referral to genetic counselor for breast risk. May be canadate for once yearly breast mri and 3d mammograms      4. Popping of both ears  Adult ENT  Referral       5. Sebaceous cyst  Will monitor. Do not squeeze or pick.     back        6. Cutaneous hypersensitivity  TSH, CBC with platelets  Unclear as to cause. Will r/o thyroid, anemia. Monitor and rtc if continues. Will need further work up at that time.       7. Labial cyst  Monitor. Benign finding. Do not pick or squeeze. Follow up if worsening      8. Screening for diabetes mellitus  Hemoglobin A1c       9. Cervical cancer screening  Pap Screen with HPV - recommended age 30 - 65 years, HPV Hold (Lab Only)       10. Lipid screening  Lipid panel reflex to direct LDL Fasting       11. Need for Tdap vaccination  TDAP VACCINE (Adacel, Boostrix)       12. Family history of malignant neoplasm of breast  Cancer Risk Mgmt/Cancer Genetic Counseling Referral       13. BMI 26.0-26.9,adult  Start 25 grams fiber daily, 64 oz fluids, cut out sugary drinks, increase fruits and vegies to at least 5/day and cardiovascular exercise  at least 30 min daily.                COUNSELING:  Reviewed preventive health counseling, as reflected in patient instructions        She reports that she has never smoked. She has never used smokeless tobacco.          Shabnam Bryant PA-C  [facility]

## 2023-02-21 NOTE — TELEPHONE ENCOUNTER
FUTURE VISIT INFORMATION:      FUTURE VISIT INFORMATION:    Date: 3/29/23    Time: 10:45 AM    Location: [facility]  REFERRAL INFORMATION:    Referring provider: Shabnam Bryant PA-C    Referring providers clinic: [facility]    Reason for visit/diagnosis Per Pt, dx decrease in hearing, referral from PCP, recs in epic    RECORDS REQUESTED FROM:       Clinic name Comments Records Status Imaging Status   [facility] 2/21/23 OV note- Shabnam Bryant PA-C Epic

## 2023-02-21 NOTE — PATIENT INSTRUCTIONS
Making appointments with me can happen in the following ways:    Call [phone]. Depending on your needs, this can be in person or virtual.  You can also schedule appointments on your own through [employer].   If you are part of [employer], there is also an option for E-Visits when appropriate. Please follow the recommended guidelines for this.  4.   You are also welcome to schedule with my partner, Shauna Cortez.  She's an NP that works closely with me in helping my access in seeing patients when they can't get in to see me in a timely manner.     Communication with me can happen in the following ways:  Call [phone] with your concerns.  Use [employer]     The [employer] update is intended for a one-way communication to update your medical history and not intended to be a back-and-forth or for medical advice for new issues.     A reminder that an evisit is intended for any medical advice , prescription, labs or referrals that are needed.        Preventive Health Recommendations  Female Ages 26 - 39  Yearly exam:   See your health care provider every year in order to  Review health changes.   Discuss preventive care.    Review your medicines if you your doctor has prescribed any.    Until age 30: Get a Pap test every three years (more often if you have had an abnormal result).    After age 30: Talk to your doctor about whether you should have a Pap test every 3 years or have a Pap test with HPV screening every 5 years.   You do not need a Pap test if your uterus was removed (hysterectomy) and you have not had cancer.  You should be tested each year for STDs (sexually transmitted diseases), if you're at risk.   Talk to your provider about how often to have your cholesterol checked.  If you are at risk for diabetes, you should have a diabetes test (fasting glucose).  Shots: Get a flu shot each year. Get a tetanus shot every 10 years.   Nutrition:   Eat at least 5 servings of fruits and vegetables each day.  Eat whole-grain  bread, whole-wheat pasta and brown rice instead of white grains and rice.  Get adequate Calcium and Vitamin D.     Lifestyle  Exercise at least 150 minutes a week (30 minutes a day, 5 days of the week). This will help you control your weight and prevent disease.  Limit alcohol to one drink per day.  No smoking.   Wear sunscreen to prevent skin cancer.  See your dentist every six months for an exam and cleaning.

## 2023-02-22 ENCOUNTER — MYC MEDICAL ADVICE (OUTPATIENT)
Dept: PEDIATRICS | Facility: CLINIC | Age: 38
End: 2023-02-22
Payer: COMMERCIAL

## 2023-02-22 DIAGNOSIS — N64.4 BREAST PAIN, LEFT: Primary | ICD-10-CM

## 2023-02-22 PROBLEM — L65.9 HAIR LOSS: Status: ACTIVE | Noted: 2022-11-01

## 2023-02-23 NOTE — TELEPHONE ENCOUNTER
Per visit:    1. Left breast soreness: noted for aprox two months. Not associated with menses. No lump. No redness. Comes and goes. No nipple discharge. Has family hx of breast cancer. 3/4/22 had normal mammogram. No injury. No rash.     ------------    Shabnam, would you like pt to have diagnostic mammo/US due to symptoms?

## 2023-02-24 LAB
BKR LAB AP GYN ADEQUACY: NORMAL
BKR LAB AP GYN INTERPRETATION: NORMAL
BKR LAB AP HPV REFLEX: NORMAL
BKR LAB AP PREVIOUS ABNL DX: NORMAL
BKR LAB AP PREVIOUS ABNORMAL: NORMAL
PATH REPORT.COMMENTS IMP SPEC: NORMAL
PATH REPORT.COMMENTS IMP SPEC: NORMAL
PATH REPORT.RELEVANT HX SPEC: NORMAL

## 2023-02-27 LAB
HUMAN PAPILLOMA VIRUS 16 DNA: NEGATIVE
HUMAN PAPILLOMA VIRUS 18 DNA: NEGATIVE
HUMAN PAPILLOMA VIRUS FINAL DIAGNOSIS: NORMAL
HUMAN PAPILLOMA VIRUS OTHER HR: NEGATIVE

## 2023-03-01 ENCOUNTER — PATIENT OUTREACH (OUTPATIENT)
Dept: PEDIATRICS | Facility: CLINIC | Age: 38
End: 2023-03-01
Payer: COMMERCIAL

## 2023-03-02 ENCOUNTER — ANCILLARY PROCEDURE (OUTPATIENT)
Dept: MAMMOGRAPHY | Facility: CLINIC | Age: 38
End: 2023-03-02
Attending: PHYSICIAN ASSISTANT
Payer: COMMERCIAL

## 2023-03-02 DIAGNOSIS — N64.4 BREAST PAIN, LEFT: ICD-10-CM

## 2023-03-02 PROCEDURE — 77066 DX MAMMO INCL CAD BI: CPT | Performed by: RADIOLOGY

## 2023-03-02 PROCEDURE — 76642 ULTRASOUND BREAST LIMITED: CPT | Mod: LT | Performed by: RADIOLOGY

## 2023-03-02 PROCEDURE — G0279 TOMOSYNTHESIS, MAMMO: HCPCS | Performed by: RADIOLOGY

## 2023-03-07 ENCOUNTER — ANCILLARY PROCEDURE (OUTPATIENT)
Dept: MAMMOGRAPHY | Facility: CLINIC | Age: 38
End: 2023-03-07
Attending: PHYSICIAN ASSISTANT
Payer: COMMERCIAL

## 2023-03-07 DIAGNOSIS — N64.4 BREAST PAIN, LEFT: ICD-10-CM

## 2023-03-07 DIAGNOSIS — R92.1 BREAST CALCIFICATIONS: ICD-10-CM

## 2023-03-07 PROCEDURE — G0279 TOMOSYNTHESIS, MAMMO: HCPCS | Performed by: STUDENT IN AN ORGANIZED HEALTH CARE EDUCATION/TRAINING PROGRAM

## 2023-03-07 PROCEDURE — 88305 TISSUE EXAM BY PATHOLOGIST: CPT | Mod: TC | Performed by: PHYSICIAN ASSISTANT

## 2023-03-07 PROCEDURE — 77066 DX MAMMO INCL CAD BI: CPT | Performed by: STUDENT IN AN ORGANIZED HEALTH CARE EDUCATION/TRAINING PROGRAM

## 2023-03-07 PROCEDURE — 19081 BX BREAST 1ST LESION STRTCTC: CPT | Mod: RT | Performed by: STUDENT IN AN ORGANIZED HEALTH CARE EDUCATION/TRAINING PROGRAM

## 2023-03-07 PROCEDURE — 88305 TISSUE EXAM BY PATHOLOGIST: CPT | Mod: 26 | Performed by: PATHOLOGY

## 2023-03-07 RX ORDER — IOPAMIDOL 755 MG/ML
125 INJECTION, SOLUTION INTRAVASCULAR ONCE
Status: COMPLETED | OUTPATIENT
Start: 2023-03-07 | End: 2023-03-07

## 2023-03-07 RX ORDER — LIDOCAINE HYDROCHLORIDE AND EPINEPHRINE 10; 10 MG/ML; UG/ML
10 INJECTION, SOLUTION INFILTRATION; PERINEURAL ONCE
Status: COMPLETED | OUTPATIENT
Start: 2023-03-07 | End: 2023-03-07

## 2023-03-07 RX ADMIN — IOPAMIDOL 90 ML: 755 INJECTION, SOLUTION INTRAVASCULAR at 09:13

## 2023-03-07 RX ADMIN — LIDOCAINE HYDROCHLORIDE AND EPINEPHRINE 10 ML: 10; 10 INJECTION, SOLUTION INFILTRATION; PERINEURAL at 09:36

## 2023-03-09 LAB
PATH REPORT.COMMENTS IMP SPEC: NORMAL
PATH REPORT.COMMENTS IMP SPEC: NORMAL
PATH REPORT.FINAL DX SPEC: NORMAL
PATH REPORT.GROSS SPEC: NORMAL
PATH REPORT.MICROSCOPIC SPEC OTHER STN: NORMAL
PATH REPORT.RELEVANT HX SPEC: NORMAL
PHOTO IMAGE: NORMAL

## 2023-03-10 ENCOUNTER — TELEPHONE (OUTPATIENT)
Dept: MAMMOGRAPHY | Facility: CLINIC | Age: 38
End: 2023-03-10
Payer: COMMERCIAL

## 2023-03-10 NOTE — TELEPHONE ENCOUNTER
Spoke with patient regarding right breast biopsy results, which indicate negative for atypia or malignancy. Notified patient that the radiologist's recommendation is annual mammography. Patient verbalized understanding of these results and all questions answered to her satisfaction.

## 2023-03-13 DIAGNOSIS — Z01.10 ENCOUNTER FOR HEARING TEST: Primary | ICD-10-CM

## 2023-03-22 ENCOUNTER — TELEPHONE (OUTPATIENT)
Dept: OTOLARYNGOLOGY | Facility: CLINIC | Age: 38
End: 2023-03-22
Payer: COMMERCIAL

## 2023-03-29 ENCOUNTER — PRE VISIT (OUTPATIENT)
Dept: OTOLARYNGOLOGY | Facility: CLINIC | Age: 38
End: 2023-03-29

## 2023-05-23 NOTE — PROGRESS NOTES
Virtual Visit Details  Type of service:  Video Visit   Originating Location (pt. Location): Home  Distant Location (provider location):  Off-site  Platform used for Video Visit: [email or web address]    5/24/2023    Referring Provider: Shabnam Bryant PA-C    Presenting Information:   I met with Анна Alexnader today for her video genetic counseling visit through the Cancer Risk Management Program to discuss her family history of breast and prostate cancer. She is here today to review this history, cancer screening recommendations, and available genetic testing options.    Personal History:  Анна is a 37 year old female. She does not have any personal history of cancer. In her hormonal-based medical history, she had her first menstrual period at age 11, her first child at age 29, and is premenopausal. Анна has her ovaries, fallopian tubes and uterus in place, and reports no ovarian cancer screening to date. She reports no history of hormone replacement therapy. Анна reports oral contraceptive use for approximately 10 years.       Анна has regular clinical breast exams and mammograms; her most recent mammogram in March 2023 found a group of pleomorphic calcifications in the right breast. Pathology revealed the calcifications were associated with benign ducts and acini (predominantly in columnar cell change). Анна has not had a colonoscopy. She reports a history of dermatological exams. She does not regularly do any other cancer screening at this time. Анна reported social smoking for approximately 10 years and consumes approximately 18 alcoholic beverages per week.    Family History: (Please see scanned pedigree for detailed family history information)    Анна's mother was diagnosed with skin cancer at age 40 on her nose and breast cancer at age 51. She is currently 63.     Maternal uncle was diagnosed with prostate cancer in his 70's. He is currently in his early 70's.   Maternal grandmother was diagnosed with  breast cancer at age 67 with recurrence at age 74. It was reported that she underwent a total abdominal hysterectomy. She is currently 87.    Maternal grandfather was diagnosed with prostate cancer in his 60's with metastatic recurrence. He passed away at age 84.    Of note, there is no reported history of cancer on her paternal side of her family.       Her maternal ethnicity is [place]. Her paternal ethnicity is [place].  There is no known Ashkenazi Religion ancestry on either side of her family. There is no reported consanguinity.    Discussion:  Анна's family history of breast and prostate cancer is suggestive of a hereditary cancer syndrome.  We reviewed the features of sporadic, familial, and hereditary cancers.   The vast majority of cancers are considered sporadic and not primarily due to an inherited factor. Individuals can develop cancer due to aging, chance events, environmental exposures or lifestyles.  Features typically seen in high risk families include: cancers diagnosed under age 50, multiple relatives with similar cancers on the same side of the family, cancers in multiple generations, and relatives with certain rare cancers (i.e., male breast cancer).   We discussed the natural history and genetics of several hereditary cancer syndromes, including Hereditary Breast and Ovarian Cancer Syndrome (HBOC).   The most common cause of hereditary breast and ovarian cancer is HBOC, which is caused by mutations in the BRCA1 and BRCA2 genes. Individuals with HBOC syndrome are at increased risk for several different cancers including: female and male breast cancer, ovarian cancer, prostate cancer, melanoma, and pancreatic cancer. HBOC typically presents with multiple family members diagnosed with breast cancer before age 50 and/or ovarian cancer.   A detailed handout regarding information about HBOC and related hereditary cancer syndromes will be provided to Анна via [email or web address] and can be found in  the after visit summary. Topics included: inheritance pattern, cancer risks, cancer screening recommendations, and also risks, benefits and limitations of testing.  In looking at Анна's personal and family history, it is possible that a cancer susceptibility gene is present due to her mother diagnosed with breast cancer at age 51, her maternal grandmother diagnosed with breast cancer at age 67, and her maternal uncle diagnosed with prostate cancer in his 70's.  Based on her personal and family history, Анна meets current National Comprehensive Cancer Network (NCCN) criteria for genetic testing of high-penetrance breast cancer susceptibility genes (including BRCA1, BRCA2, CDH1, PALB2, PTEN, and TP53).   We discussed that there are additional genes that could cause increased risk for breast cancer. As many of these genes present with overlapping features in a family and accurate cancer risk cannot always be established based upon the pedigree analysis alone, it would be reasonable for Анна to consider panel genetic testing to analyze multiple genes at once.  We reviewed genetic testing options for Анна based on her personal and family history: a panel of genes associated with an increased risk for hereditary breast and gynecologic cancers, or larger panel options to include genes associated with increased risk for multiple different cancer types. Анна expressed interest in the BRCA1 and BRCA2 genes with automatic reflex the Common Hereditary Cancers panel through [facility].   Genetic testing is available for 47 genes associated with cancers of the breast, ovary, uterus, prostate and gastrointestinal system: [facility] Common Hereditary Cancers panel (APC, SARA, AXIN2, BARD1, BMPR1A, BRCA1, BRCA2, BRIP1, CDH1, CDK4, CDKN2A, CHEK2, CTNNA1, DICER1, EPCAM, GREM1, HOXB13, KIT, MEN1, MLH1, MSH2, MSH3, MSH6, MUTYH, NBN, NF1, NTHL1, PALB2, PDGFRA, PMS2, POLD1, POLE, PTEN,RAD50, RAD51C, RAD51D, SDHA, SDHB,  SDHC, SDHD, SMAD4, SMARCA4, STK11, TP53,TSC1, TSC2, VHL).    We discussed that many of these genes are associated with specific hereditary cancer syndromes and published management guidelines: Hereditary Breast and Ovarian Cancer syndrome (BRCA1, BRCA2), Rai syndrome (MLH1, MSH2, MSH6, PMS2, EPCAM), Familial Adenomatous Polyposis (APC), Hereditary Diffuse Gastric Cancer (CDH1), Familial Atypical Multiple Mole Melanoma syndrome (CDK4, CDKN2A), Multiple Endocrine Neoplasia type 1 (MEN1), Juvenile Polyposis syndrome (BMPR1A, SMAD4), Cowden syndrome (PTEN), Li Fraumeni syndrome (TP53), Hereditary Paraganglioma and Pheochromocytoma syndrome (SDHA, SDHB, SDHC, SDHD), Peutz-Jeghers syndrome (STK11), MUTYH Associated Polyposis (MUTYH), Tuberous sclerosis complex (TSC1, TSC2), Von Hippel-Lindau disease (VHL), and Neurofibromatosis type 1 (NF1).   The SARA, AXIN2, BRIP1, CHEK2, GREM1, MSH3, NBN, NTHL1, PALB2, POLD1, POLE, RAD51C, and RAD51D genes are associated with increased cancer risk and have published management guidelines for certain cancers.   The remaining genes (BARD1, CTNNA1, DICER1, HOXB13, KIT, PDGFRA, RAD50, and SMARCA4) are associated with increased cancer risk and may allow us to make medical recommendations when mutations are identified.   Анна stated that she would prefer to submit a saliva kit for her genetic testing. [facility] will send a kit directly to their home with directions on how to collect a saliva sample. We discussed that the success of the testing depends on how well she follows the directions and that there is a small chance for sample failure. Анна verbalized understanding of this. Once the sample is collected, she will send it to [facility] using the return envelope and prepaid shipping label.   Verbal consent was given over video and written on the consent form. Turnaround time is approximately 3-4 weeks once the lab receives the sample.     Medical Management: For  Анна, we reviewed that the information from genetic testing may determine:    additional cancer screening for which Анна may qualify (i.e. mammogram and breast MRI, more frequent colonoscopies, more frequent dermatologic exams, etc.),    options for risk reducing surgeries Анна could consider (i.e. bilateral mastectomy, surgery to remove her ovaries and/or uterus, etc.),      and targeted chemotherapies if she were to develop certain cancers in the future (i.e. immunotherapy for individuals with Rai syndrome, PARP inhibitors, etc.).     These recommendations and possible targeted chemotherapies will be discussed in detail once genetic testing is completed.     Plan:  1) Today Анна elected to proceed with the BRCA1 and BRCA2 genes with automatic reflex the Common Hereditary Cancers panel through [facility]. Therefore, consent was reviewed and verbally obtained for this testing.   2) A saliva kit will be mailed to Анна's house from [facility] and shipped back to them for her genetic testing.   3) The results should be available in 3-4 weeks after [facility] received the saliva kit.  4) Анна will either have a telephone visit or video visit to discuss the results.  Additional recommendations about screening will be made at that time.    Анна is 37 year old and is being evaluated via a billable video visit.    Time spent on video: 61 minutes    Bella Adame MS, [facility] [place]  Licensed, Certified Genetic Counselor  Phone: [phone]

## 2023-05-24 ENCOUNTER — VIRTUAL VISIT (OUTPATIENT)
Dept: ONCOLOGY | Facility: CLINIC | Age: 38
End: 2023-05-24
Attending: PHYSICIAN ASSISTANT
Payer: COMMERCIAL

## 2023-05-24 DIAGNOSIS — Z80.8 FAMILY HISTORY OF SKIN CANCER: ICD-10-CM

## 2023-05-24 DIAGNOSIS — Z80.3 FAMILY HISTORY OF MALIGNANT NEOPLASM OF BREAST: Primary | ICD-10-CM

## 2023-05-24 DIAGNOSIS — Z80.42 FAMILY HISTORY OF PROSTATE CANCER: ICD-10-CM

## 2023-05-24 PROCEDURE — 96040 HC GENETIC COUNSELING, EACH 30 MINUTES: CPT | Mod: GT,95

## 2023-05-24 NOTE — Clinical Note
5/24/2023         RE: Анна Alexander  [address] [place] [address]        Dear Colleague,    Thank you for referring your patient, Анна Alexander, to the [facility]. Please see a copy of my visit note below.    Virtual Visit Details  Type of service:  Video Visit   Originating Location (pt. Location): Home  Distant Location (provider location):  Off-site  Platform used for Video Visit: [employer]    5/24/2023    Referring Provider: Shabnam Bryant PA-C    Presenting Information:   I met with Анна Alexander today for her video genetic counseling visit through the [facility] to discuss her family history of breast and prostate cancer. She is here today to review this history, cancer screening recommendations, and available genetic testing options.    Personal History:  Анна is a 37 year old female. She does not have any personal history of cancer. In her hormonal-based medical history, she had her first menstrual period at age 11, her first child at age 29, and is premenopausal. Анна has her ovaries, fallopian tubes and uterus in place, and reports no ovarian cancer screening to date. She reports no history of hormone replacement therapy. Анна reports oral contraceptive use for approximately 10 years.       Анна has regular clinical breast exams and mammograms; her most recent mammogram in March 2023 found a group of pleomorphic calcifications in the right breast. Pathology revealed the calcifications were associated with benign ducts and acini (predominantly in columnar cell change). Анна has not had a colonoscopy. She reports a history of dermatological exams. She does not regularly do any other cancer screening at this time. Анна reported social smoking for approximately 10 years and consumes approximately 18 alcoholic beverages per week.    Family History: (Please see scanned pedigree for detailed family history information)    Buds  mother was diagnosed with skin cancer at age 40 on her nose and breast cancer at age 51. She is currently 63.     Maternal uncle was diagnosed with prostate cancer in his 70's. He is currently in his early 70's.   Maternal grandmother was diagnosed with breast cancer at age 67 with recurrence at age 74. It was reported that she underwent a total abdominal hysterectomy. She is currently 87.    Maternal grandfather was diagnosed with prostate cancer in his 60's with metastatic recurrence. He passed away at age 84.    Of note, there is no reported history of cancer on her paternal side of her family.       Her maternal ethnicity is Serbian [place]. Her paternal ethnicity is [place]akian.  There is no known Ashkenazi Caodaism ancestry on either side of her family. There is no reported consanguinity.    Discussion:  Анна's family history of breast and prostate cancer is suggestive of a hereditary cancer syndrome.  We reviewed the features of sporadic, familial, and hereditary cancers.   The vast majority of cancers are considered sporadic and not primarily due to an inherited factor. Individuals can develop cancer due to aging, chance events, environmental exposures or lifestyles.  Features typically seen in high risk families include: cancers diagnosed under age 50, multiple relatives with similar cancers on the same side of the family, cancers in multiple generations, and relatives with certain rare cancers (i.e., male breast cancer).   We discussed the natural history and genetics of several hereditary cancer syndromes, including Hereditary Breast and Ovarian Cancer Syndrome (HBOC).   The most common cause of hereditary breast and ovarian cancer is HBOC, which is caused by mutations in the BRCA1 and BRCA2 genes. Individuals with HBOC syndrome are at increased risk for several different cancers including: female and male breast cancer, ovarian cancer, prostate cancer, melanoma, and pancreatic cancer. HBOC typically  presents with multiple family members diagnosed with breast cancer before age 50 and/or ovarian cancer.   A detailed handout regarding information about HBOC and related hereditary cancer syndromes will be provided to Анна via [employer] and can be found in the after visit summary. Topics included: inheritance pattern, cancer risks, cancer screening recommendations, and also risks, benefits and limitations of testing.  In looking at Анна's personal and family history, it is possible that a cancer susceptibility gene is present due to her mother diagnosed with breast cancer at age 51, her maternal grandmother diagnosed with breast cancer at age 67, and her maternal uncle diagnosed with prostate cancer in his 70's.  Based on her personal and family history, Анна meets current National Comprehensive Cancer Network (NCCN) criteria for genetic testing of high-penetrance breast cancer susceptibility genes (including BRCA1, BRCA2, CDH1, PALB2, PTEN, and TP53).   We discussed that there are additional genes that could cause increased risk for breast cancer. As many of these genes present with overlapping features in a family and accurate cancer risk cannot always be established based upon the pedigree analysis alone, it would be reasonable for Анна to consider panel genetic testing to analyze multiple genes at once.  We reviewed genetic testing options for Анна based on her personal and family history: a panel of genes associated with an increased risk for hereditary breast and gynecologic cancers, or larger panel options to include genes associated with increased risk for multiple different cancer types. Анна expressed interest in the BRCA1 and BRCA2 genes with automatic reflex the Common Hereditary Cancers panel through [facility].   Genetic testing is available for 47 genes associated with cancers of the breast, ovary, uterus, prostate and gastrointestinal system: [facility] Common Hereditary Cancers  panel (APC, SARA, AXIN2, BARD1, BMPR1A, BRCA1, BRCA2, BRIP1, CDH1, CDK4, CDKN2A, CHEK2, CTNNA1, DICER1, EPCAM, GREM1, HOXB13, KIT, MEN1, MLH1, MSH2, MSH3, MSH6, MUTYH, NBN, NF1, NTHL1, PALB2, PDGFRA, PMS2, POLD1, POLE, PTEN,RAD50, RAD51C, RAD51D, SDHA, SDHB, SDHC, SDHD, SMAD4, SMARCA4, STK11, TP53,TSC1, TSC2, VHL).    We discussed that many of these genes are associated with specific hereditary cancer syndromes and published management guidelines: Hereditary Breast and Ovarian Cancer syndrome (BRCA1, BRCA2), Rai syndrome (MLH1, MSH2, MSH6, PMS2, EPCAM), Familial Adenomatous Polyposis (APC), Hereditary Diffuse Gastric Cancer (CDH1), Familial Atypical Multiple Mole Melanoma syndrome (CDK4, CDKN2A), Multiple Endocrine Neoplasia type 1 (MEN1), Juvenile Polyposis syndrome (BMPR1A, SMAD4), Cowden syndrome (PTEN), Li Fraumeni syndrome (TP53), Hereditary Paraganglioma and Pheochromocytoma syndrome (SDHA, SDHB, SDHC, SDHD), Peutz-Jeghers syndrome (STK11), MUTYH Associated Polyposis (MUTYH), Tuberous sclerosis complex (TSC1, TSC2), Von Hippel-Lindau disease (VHL), and Neurofibromatosis type 1 (NF1).   The SARA, AXIN2, BRIP1, CHEK2, GREM1, MSH3, NBN, NTHL1, PALB2, POLD1, POLE, RAD51C, and RAD51D genes are associated with increased cancer risk and have published management guidelines for certain cancers.   The remaining genes (BARD1, CTNNA1, DICER1, HOXB13, KIT, PDGFRA, RAD50, and SMARCA4) are associated with increased cancer risk and may allow us to make medical recommendations when mutations are identified.   Анна stated that she would prefer to submit a saliva kit for her genetic testing. [facility] will send a kit directly to their home with directions on how to collect a saliva sample. We discussed that the success of the testing depends on how well she follows the directions and that there is a small chance for sample failure. Анна verbalized understanding of this. Once the sample is collected, she will send  it to [facility] using the return envelope and prepaid shipping label.   Verbal consent was given over video and written on the consent form. Turnaround time is approximately 3-4 weeks once the lab receives the sample.     Medical Management: For Анна, we reviewed that the information from genetic testing may determine:    additional cancer screening for which Анна may qualify (i.e. mammogram and breast MRI, more frequent colonoscopies, more frequent dermatologic exams, etc.),    options for risk reducing surgeries Анна could consider (i.e. bilateral mastectomy, surgery to remove her ovaries and/or uterus, etc.),      and targeted chemotherapies if she were to develop certain cancers in the future (i.e. immunotherapy for individuals with Rai syndrome, PARP inhibitors, etc.).     These recommendations and possible targeted chemotherapies will be discussed in detail once genetic testing is completed.     Plan:  1) Today Анна elected to proceed with the BRCA1 and BRCA2 genes with automatic reflex the Common Hereditary Cancers panel through [facility]. Therefore, consent was reviewed and verbally obtained for this testing.   2) A saliva kit will be mailed to Анна's house from [facility] and shipped back to them for her genetic testing.   3) The results should be available in 3-4 weeks after [facility] received the saliva kit.  4) Анна will either have a telephone visit or video visit to discuss the results.  Additional recommendations about screening will be made at that time.    Анна is 37 year old and is being evaluated via a billable video visit.    Time spent on video: 61 minutes    Bella Adame MS, [facility] [place]  Licensed, Certified Genetic Counselor  Phone: [phone]      Again, thank you for allowing me to participate in the care of your patient.        Sincerely,        Bella Adame GC

## 2023-05-24 NOTE — PATIENT INSTRUCTIONS
Assessing Cancer Risk  Cancer is a common diagnosis which impacts many families.  Individuals may develop cancer due to environmental factors (such as exposures and lifestyle), aging, genetic predisposition, or a combination of these factors.      Only about 5-10% of cancers are thought to be due to an inherited cancer susceptibility gene.    These families often have:  Several people with the same or related types of cancer  Cancers diagnosed at a young age (before age 50)  Individuals with more than one primary cancer  Multiple generations of the family affected with cancer    Comprehensive Breast and Gynecologic Cancer Panel  We each inherit two copies of every gene in our bodies: one from our mother, and one from our father. Each gene has a specific job to do.  When a gene has a mistake or  mutation  in it, it does not work like it should.     Some people may be candidates for genetic testing of more than one gene.  For these families, genetic testing using a cancer panel may be offered. These panels will test different genes at once known to increase the risk for breast, ovarian, uterine, and/or other cancers.    This handout will review common hereditary breast and gynecologic cancer syndromes. The genes that will be discussed in this handout are: SARA, BRCA1, BRCA2, BRIP1, CDH1, CHEK2, MLH1, MSH2, MSH6, PMS2, EPCAM, PTEN, PALB2, RAD51C, RAD51D, and TP53.    The purpose of this handout is to serve as a brief summary of the breast and gynecologic cancer risk genes that have published clinical management guidelines for individuals who are found to carry a mutation. Inheriting a mutation does not mean a person will develop cancer, but it does significantly increase their risk above the general population risk.     ______________________________________________________________________________    Hereditary Breast and Ovarian Cancer Syndrome (BRCA1 and BRCA2)  A single mutation in one of the copies of BRCA1 or  BRCA2 increases the risk for breast and ovarian cancer, among others.  The risk for pancreatic cancer and melanoma may also be slightly increased in some families.  The chart below shows the chance that someone with a BRCA mutation would develop cancer in his or her lifetime1,2,3,4.       Lifetime Cancer Risks    General Population BRCA1  BRCA2   Breast  12% >60% >60%   Ovarian  1-2% 39-58% 13-29%   Prostate 12% 7-26% 19-61%   Male Breast 0.1% 0.2-1.2% 1.8-7.1%   Pancreas 1-2% Up to 5% 5-10%     A person s ethnic background is also important to consider, as individuals of Ashkenazi Quaker ancestry have a higher chance of having a BRCA gene mutation.  There are three BRCA mutations that occur more frequently in this population.      Rai Syndrome (MLH1, MSH2, MSH6, PMS2, and EPCAM)  Currently five genes are known to cause Rai Syndrome: MLH1, MSH2, MSH6, PMS2, and EPCAM.  A single mutation in one of the Rai Syndrome genes increases the risk for colon, endometrial, ovarian, and stomach cancers.  Other cancers that occur less commonly in Rai Syndrome include urinary tract, skin, and brain cancers.  The chart below shows the chance that a person with Rai syndrome would develop cancer in his or her lifetime5.      Lifetime Cancer Risks    General Population Rai Syndrome   Colon 5% 10-61%   Endometrial 3% 13-57%   Ovarian 1-2% 1-38%   Stomach <1% 1-9%   *Cancer risk varies depending on Rai syndrome gene found      Cowden Syndrome (PTEN)  Cowden syndrome is a hereditary condition that increases the risk for breast, thyroid, endometrial, colon, and kidney cancer.  Cowden syndrome is caused by a mutation in the PTEN gene.  A single mutation in one of the copies of PTEN causes Cowden syndrome and increases cancer risk.  The chart below shows the chance that someone with a PTEN mutation would develop cancer in their lifetime6,7.  Other benign features seen in some individuals with Cowden syndrome include benign  skin lesions (facial papules, keratoses, lipomas), learning disability, autism, thyroid nodules, colon polyps, and larger head size.     Lifetime Cancer Risks    General Population Cowden   Breast 12% 40-60%*   Thyroid 1% Up to 38%   Renal 1-2% Up to 35%   Endometrial 3% Up to 28%   Colon 5% Up to 9%   Melanoma 2-3% Up to 6%   *Emerging data suggests the risk for breast cancer could be greater than 60%               Li-Fraumeni Syndrome (TP53)  Li-Fraumeni Syndrome (LFS) is a cancer predisposition syndrome caused by a mutation in the TP53 gene. A single mutation in one of the copies of TP53 increases the risk for multiple cancers. Individuals with LFS are at an increased risk for developing cancer at a young age. The lifetime risk for development of a LFS-associated cancer is 50% by age 30 and 90% by age 60.   Core Cancers: Sarcomas, Breast, Brain, Lung, Leukemias/Lymphomas, Adrenocortical carcinomas  Other Cancers: Gastrointestinal, Thyroid, Skin, Genitourinary       Hereditary Diffuse Gastric Cancer (CDH1)  Currently, one gene is known to cause hereditary diffuse gastric cancer (HDGC): CDH1.  Individuals with HDGC are at increased risk for diffuse gastric cancer and lobular breast cancer. Of people diagnosed with HDGC, 30-50% have a mutation in the CDH1 gene.  This suggests there are likely other genes that may cause HDGC that have not been identified yet.      Lifetime Cancer Risks    General Population HDGC   Diffuse Gastric  <1% ~80%   Breast 12% 41-60%       Additional Genes    SARA  SARA is a moderate-risk breast cancer gene. Women who have a mutation in SARA can have between a 2-4 fold increased risk for breast cancer compared to the general population8. SARA mutations have also been associated with increased risk for pancreatic cancer between 5-10%9. Individuals who inherit two SARA mutations have a condition called ataxia-telangiectasia (AT).  This rare autosomal recessive condition affects the nervous system  and immune system, and is associated with progressive cerebellar ataxia beginning in childhood. Individuals with ataxia-telangiectasia often have a weakened immune system and have an increased risk for childhood cancers.    PALB2  Mutations in PALB2 have been shown to increase the risk of breast cancer up to 41-60% in some families; where individuals fall within this risk range is dependent upon family [id number]. PALB2 mutations have also been associated with increased risk for pancreatic cancer between 5-10%.  Individuals who inherit two PALB2 mutations--one from their mother and one from their father--have a condition called Fanconi Anemia.  This rare autosomal recessive condition is associated with short stature, developmental delay, bone marrow failure, and increased risk for childhood cancers.    CHEK2   CHEK2 is a moderate-risk breast cancer gene.  Women who have a mutation in CHEK2 have around a 2-4 fold increased risk for breast cancer compared to the general population, and this risk may be higher depending upon family history.11,12,13 The risk of colon cancer may be twice as high as the general population risk of colon cancer of 5%. Mutations in CHEK2 have also been shown to increase the risk of other cancers, including prostate, however these cancer risks are currently not well understood.    BRIP1, RAD51C and RAD51D  Mutations in RAD51C and RAD51D have been shown to increase the risk of ovarian cancer and breast cancer 14,. Mutations in BRIP1 have been shown to increase the risk of ovarian cancer and possibly female breast cancer 15 .       Lifetime Cancer Risk    General Population        BRIP1   RAD51C  RAD51D   Breast 12% Not well defined 20-40% 20-40%   Ovarian 1-2% 5-15% 10-15% 10-20%     ______________________________________________________________  Inheritance  All of the cancer syndromes reviewed above are inherited in an autosomal dominant pattern.  This means that if a parent has a mutation,  each of their children will have a 50% chance of inheriting that same mutation. Therefore, each child --male or female-- would have a 50% chance of being at increased risk for developing cancer.    Image obtained from Genetics Home Reference, 2013     Mutations in some genes can occur de vira, which means that a person s mutation occurred for the first time in them and was not inherited from a parent.  Now that they have the mutation, however, it can be passed on to future generations.    Genetic Testing  Genetic testing involves a blood test and will look for any harmful mutations that are associated with increased cancer risk.  If possible, it is recommended that the person(s) who has had cancer be tested before other family members.  That person will give us the most useful information about whether or not a specific gene is associated with the cancer in the family.    Results  There are three possible results of genetic testing:  Positive--a harmful mutation was identified in one or more of the genes  Negative--no mutations were identified in any of the genes tested  Variant of unknown significance--a variation in one of the genes was identified, but it is unclear how this impacts cancer risk in the family    Advantages and Disadvantages   There are advantages and disadvantages to genetic testing.    Advantages  May clarify your cancer risk  Can help you make medical decisions  May explain the cancers in your family  May give useful information to your family members (if you share your results)    Disadvantages  Possible negative emotional impact of learning about inherited cancer risk  Uncertainty in interpreting a negative test result in some situations  Possible genetic discrimination concerns (see below)    Genetic Information Nondiscrimination Act (CHARLY)  The Genetic Information Nondiscrimination Act of 2008 (CHARLY) is a federal law that protects individuals from health insurance or employment discrimination  based on a genetic test result alone (with some exceptions, including employers with fewer than 15 employees, and ).  Although rare, CHARLY  does not cover discrimination protections in terms of life insurance, long term care, or disability insurances.  Visit the National Human Qwick[employer] Research [place] website to learn more.    Reducing Cancer Risk  All of the genes described in this handout have nationally recognized cancer screening guidelines that would be recommended for individuals who test positive.  In addition to increased cancer screening, surgeries may be offered or recommended to reduce cancer risk.  Recommendations are based upon an individual s genetic test result as well as their personal and family history of cancer.    Questions to Think About Regarding Genetic Testing:  What effect will the test result have on me and my relationship with my family members if I have an inherited gene mutation?  If I don t have a gene mutation?  Should I share my test results, and how will my family react to this news, which may also affect them?  Are my children ready to learn new information that may one day affect their own health?    Hereditary Cancer Resources    FORCE: Facing Our Risk of Cancer Empowered [email or web address]   Bright Pink bebrightpink.org   Li-Fraumeni Syndrome Association lfsassociation.org   PTEN World PTENworld.com   No stomach for cancer, Inc. nostomachforcancer.org   Stomach cancer relief network Scrnet.org   Collaborative Group of the Americas on Inherited Colorectal Cancer (CGA) cgaicc.com    Cancer Care cancercare.org   American Cancer Society (ACS) cancer.org   National Cancer [place] (NCI) cancer.gov     Please call us if you have any questions or concerns.   Cancer Risk Management Program [phone]-CANCER ([phone])  Diego Saab, MS [facility]  [phone]  Bella Adame, MS, [facility] [phone]  Mary Rojas, MS, [facility]  [phone]  Catalina rTinh, MS, [facility]  [phone]  Zoe Danielle,  MS, [facility]  [phone]  Viviana Ruiz, MS, [facility] [phone]  Daphne Bowens, MS, [facility] [phone]    References  Rizwan Link PDP, Batsheva S, Davy GONZALEZ, Katarzyna JE, Kimberly JL, Torie N, Ajay H, Jhon O, Nargis A, Pasini B, Radizohra P, Maneric S, Eros DM, Aviles N, Frederick E, Travis H, Robert E, Stella J, Gronnikolas J, Catherine B, Tulinius H, Thorlacius S, Eerola H, Nevanlinna H, Alexei K, Davon OP. Average risks of breast and ovarian cancer associated with BRCA1 or BRCA2 mutations detected in case series unselected for family history: a combined analysis of 222 studies. Am J Hum Sh[place]onda. 2003;72:1117-30.  Kendrick N, Aurora M, Paulette G.  BRCA1 and BRCA2 Hereditary Breast and Ovarian Cancer. Gene Reviews online. 2013.  Julián YC, Pasquale S, Marshall G, Goel S. Breast cancer risk among male BRCA1 and BRCA2 mutation carriers. J Natl Cancer Inst. 2007;99:1811-4.  Desmond GUAJARDO, Arsalan I, Dustin J, Salomón E, Johnny ER, Briseyda F. Risk of breast cancer in male BRCA2 carriers. J Med Shalonda. 2010;47:710-1.  National Comprehensive Cancer Network. Clinical practice guidelines in oncology, colorectal cancer screening. Available online (registration required). 2015.  Jp MH, Judy J, Den J, Brian BETTENCOURT, Jeffry MS, Eng C. Lifetime cancer risks in individuals with germline PTEN mutations. Clin Cancer Res. 2012;18:400-7.  Terri R. Cowden Syndrome: A Critical Review of the Clinical Literature. J Shalonda . 2009:18:13-27.  Dereck IZQUIERDO, Pierre GÓMEZ, Ghazal S, Lucinda P, America T, Sierra M, Cain B, Sam H, Ramu R, Leon K, Benson L, Desmond GUAJARDO, Eros GÓMEZ, Kevin DF, Kem MR, The Breast Cancer Susceptibility Collaboration ([place]) & Kale JUÁREZ. SARA mutations that cause ataxia-telangiectasia are breast cancer susceptibility alleles. Nature Genetics. 2006;38:873-875  Ravi N , Rosio Y, Neida J, Erwin L, Brock STANLEY , Jovanny ML, Magalis S, Bruno AG, Dave S, Beatriz ML, Delroy J , Roxie R, Kizzy HALEY, Lucero  JR, Willem VE, Caio M, Voflostein B, Loyd N, Yamilet RH, Debora KW, and Matt AP. SARA mutations in patients with hereditary pancreatic cancer. Cancer Discover. 2012;2:41-46  Emily MICHELLE., et al. Breast-Cancer Risk in Families with Mutations in PALB2. NEJM. 2014; 371(6):497-506.  CHEK2 Breast Cancer Case-Control Consortium. CHEK2*1100delC and susceptibility to breast cancer: A collaborative analysis involving 10,860 breast cancer cases and 9,065 controls from 10 studies. Am J Hum [place], 74 (2004), pp. 0502-2625  Carlos A T, Greta S, Lambert K, et al. Spectrum of Mutations in BRCA1, BRCA2, CHEK2, and TP53 in Families at High Risk of Breast Cancer. GLENN. 2006;295(12):6167-7741.   Fouzia C, Sarah D, Ary IZQUIERDO, et al. Risk of breast cancer in women with a CHEK2 mutation with and without a family history of breast cancer. J Clin Oncol. 2011;29:4155-7245.  Song H, Salazars E, Ramus SJ, et al. Contribution of germline mutations in the RAD51B, RAD51C, and RAD51D genes to ovarian cancer in the population. J Clin Oncol. 2015;33(26):4271-6184. Doi:10.1200/JCO.2015.61.2408.  Zahida T, Reji DF, Brianne P, et al. Mutations in BRIP1 confer high risk of ovarian cancer. Angie Shalonda. 2011;43(11):0926-5667. doi:10.1038/ng.955.

## 2023-05-24 NOTE — NURSING NOTE
Is the patient currently in the state of [place]? YES    Visit mode:VIDEO    If the visit is dropped, the patient can be reconnected by: VIDEO VISIT: Send to e-mail at: [email or web address]    Will anyone else be joining the visit? NO      How would you like to obtain your AVS? MyChart    Are changes needed to the allergy or medication list? NO    Reason for visit: Consult    Martha STOREY

## 2023-05-24 NOTE — LETTER
Cancer Risk Management  Program Locations    [facility]  [facility]  [facility]  [facility]  Mailing Address  Cancer Risk Management Program  [facility]  [address]  [place] [address]    New patient appointments  [phone]    May 24, 2023    Анна Alexander  [address] [place] [address]    Dear Анна,    It was a pleasure talking with you via your virtual genetic counseling visit on 5/24/2023.  Below is a copy of the progress note from that recent visit through the Cancer Risk Management Program.  If you have any additional questions, please feel free to contact me.  Referring Provider: Shabnam Bryant PA-C    Presenting Information:   I met with Анна Alexander today for her video genetic counseling visit through the Cancer Risk Management Program to discuss her family history of breast and prostate cancer. She is here today to review this history, cancer screening recommendations, and available genetic testing options.    Personal History:  Анна is a 37 year old female. She does not have any personal history of cancer. In her hormonal-based medical history, she had her first menstrual period at age 11, her first child at age 29, and is premenopausal. Анна has her ovaries, fallopian tubes and uterus in place, and reports no ovarian cancer screening to date. She reports no history of hormone replacement therapy. Анна reports oral contraceptive use for approximately 10 years.       Анна has regular clinical breast exams and mammograms; her most recent mammogram in March 2023 found a group of pleomorphic calcifications in the right breast. Pathology revealed the calcifications were associated with benign ducts and acini (predominantly in columnar cell change). Анна has not had a colonoscopy. She reports a history of  dermatological exams. She does not regularly do any other cancer screening at this time. Анна reported social smoking for approximately 10 years and consumes approximately 18 alcoholic beverages per week.    Family History: (Please see scanned pedigree for detailed family history information)    Анна's mother was diagnosed with skin cancer at age 40 on her nose and breast cancer at age 51. She is currently 63.     Maternal uncle was diagnosed with prostate cancer in his 70's. He is currently in his early 70's.     Maternal grandmother was diagnosed with breast cancer at age 67 with recurrence at age 74. It was reported that she underwent a total abdominal hysterectomy. She is currently 87.    Maternal grandfather was diagnosed with prostate cancer in his 60's with metastatic recurrence. He passed away at age 84.    Of note, there is no reported history of cancer on her paternal side of her family.       Her maternal ethnicity is [place]. Her paternal ethnicity is Slovakian.  There is no known Ashkenazi Orthodox ancestry on either side of her family. There is no reported consanguinity.    Discussion:    Анна's family history of breast and prostate cancer is suggestive of a hereditary cancer syndrome.    We reviewed the features of sporadic, familial, and hereditary cancers.     The vast majority of cancers are considered sporadic and not primarily due to an inherited factor. Individuals can develop cancer due to aging, chance events, environmental exposures or lifestyles.    Features typically seen in high risk families include: cancers diagnosed under age 50, multiple relatives with similar cancers on the same side of the family, cancers in multiple generations, and relatives with certain rare cancers (i.e., male breast cancer).     We discussed the natural history and genetics of several hereditary cancer syndromes, including Hereditary Breast and Ovarian Cancer Syndrome (HBOC).     The most common  cause of hereditary breast and ovarian cancer is HBOC, which is caused by mutations in the BRCA1 and BRCA2 genes. Individuals with HBOC syndrome are at increased risk for several different cancers including: female and male breast cancer, ovarian cancer, prostate cancer, melanoma, and pancreatic cancer. HBOC typically presents with multiple family members diagnosed with breast cancer before age 50 and/or ovarian cancer.     A detailed handout regarding information about HBOC and related hereditary cancer syndromes will be provided to Анна via [employer] and can be found in the after visit summary. Topics included: inheritance pattern, cancer risks, cancer screening recommendations, and also risks, benefits and limitations of testing.    In looking at Анна's personal and family history, it is possible that a cancer susceptibility gene is present due to her mother diagnosed with breast cancer at age 51, her maternal grandmother diagnosed with breast cancer at age 67, and her maternal uncle diagnosed with prostate cancer in his 70's.    Based on her personal and family history, Анна meets current National Comprehensive Cancer Network (NCCN) criteria for genetic testing of high-penetrance breast cancer susceptibility genes (including BRCA1, BRCA2, CDH1, PALB2, PTEN, and TP53).     We discussed that there are additional genes that could cause increased risk for breast cancer. As many of these genes present with overlapping features in a family and accurate cancer risk cannot always be established based upon the pedigree analysis alone, it would be reasonable for Анна to consider panel genetic testing to analyze multiple genes at once.    We reviewed genetic testing options for Анна based on her personal and family history: a panel of genes associated with an increased risk for hereditary breast and gynecologic cancers, or larger panel options to include genes associated with increased risk for multiple  different cancer types. Анна expressed interest in the BRCA1 and BRCA2 genes with automatic reflex the Common Hereditary Cancers panel through [facility].     Genetic testing is available for 47 genes associated with cancers of the breast, ovary, uterus, prostate and gastrointestinal system: [facility] Common Hereditary Cancers panel (APC, SARA, AXIN2, BARD1, BMPR1A, BRCA1, BRCA2, BRIP1, CDH1, CDK4, CDKN2A, CHEK2, CTNNA1, DICER1, EPCAM, GREM1, HOXB13, KIT, MEN1, MLH1, MSH2, MSH3, MSH6, MUTYH, NBN, NF1, NTHL1, PALB2, PDGFRA, PMS2, POLD1, POLE, PTEN,RAD50, RAD51C, RAD51D, SDHA, SDHB, SDHC, SDHD, SMAD4, SMARCA4, STK11, TP53,TSC1, TSC2, VHL).      We discussed that many of these genes are associated with specific hereditary cancer syndromes and published management guidelines: Hereditary Breast and Ovarian Cancer syndrome (BRCA1, BRCA2), Rai syndrome (MLH1, MSH2, MSH6, PMS2, EPCAM), Familial Adenomatous Polyposis (APC), Hereditary Diffuse Gastric Cancer (CDH1), Familial Atypical Multiple Mole Melanoma syndrome (CDK4, CDKN2A), Multiple Endocrine Neoplasia type 1 (MEN1), Juvenile Polyposis syndrome (BMPR1A, SMAD4), Cowden syndrome (PTEN), Li Fraumeni syndrome (TP53), Hereditary Paraganglioma and Pheochromocytoma syndrome (SDHA, SDHB, SDHC, SDHD), Peutz-Jeghers syndrome (STK11), MUTYH Associated Polyposis (MUTYH), Tuberous sclerosis complex (TSC1, TSC2), Von Hippel-Lindau disease (VHL), and Neurofibromatosis type 1 (NF1).     The SARA, AXIN2, BRIP1, CHEK2, GREM1, MSH3, NBN, NTHL1, PALB2, POLD1, POLE, RAD51C, and RAD51D genes are associated with increased cancer risk and have published management guidelines for certain cancers.     The remaining genes (BARD1, CTNNA1, DICER1, HOXB13, KIT, PDGFRA, RAD50, and SMARCA4) are associated with increased cancer risk and may allow us to make medical recommendations when mutations are identified.     Анна stated that she would prefer to submit a saliva kit for her genetic  testing. [facility] will send a kit directly to their home with directions on how to collect a saliva sample. We discussed that the success of the testing depends on how well she follows the directions and that there is a small chance for sample failure. Анна verbalized understanding of this. Once the sample is collected, she will send it to [facility] using the return envelope and prepaid shipping label.     Verbal consent was given over video and written on the consent form. Turnaround time is approximately 3-4 weeks once the lab receives the sample.     Medical Management: For Анна, we reviewed that the information from genetic testing may determine:    additional cancer screening for which Анна may qualify (i.e. mammogram and breast MRI, more frequent colonoscopies, more frequent dermatologic exams, etc.),    options for risk reducing surgeries Анна could consider (i.e. bilateral mastectomy, surgery to remove her ovaries and/or uterus, etc.),      and targeted chemotherapies if she were to develop certain cancers in the future (i.e. immunotherapy for individuals with Rai syndrome, PARP inhibitors, etc.).     These recommendations and possible targeted chemotherapies will be discussed in detail once genetic testing is completed.     Plan:  1) Today Анна elected to proceed with the BRCA1 and BRCA2 genes with automatic reflex the Common Hereditary Cancers panel through [facility]. Therefore, consent was reviewed and verbally obtained for this testing.   2) A saliva kit will be mailed to Анна's house from [facility] and shipped back to them for her genetic testing.   3) The results should be available in 3-4 weeks after [facility] received the saliva kit.  4) Анна will either have a telephone visit or video visit to discuss the results.  Additional recommendations about screening will be made at that time.    Анна is 37 year old and is being evaluated via a  billable video visit.    Time spent on video: 61 minutes    Bella Adame MS, [employer] [facility]  Licensed, Certified Genetic Counselor  Phone: [phone]

## 2024-02-20 SDOH — HEALTH STABILITY: PHYSICAL HEALTH: ON AVERAGE, HOW MANY DAYS PER WEEK DO YOU ENGAGE IN MODERATE TO STRENUOUS EXERCISE (LIKE A BRISK WALK)?: 3 DAYS

## 2024-02-20 SDOH — HEALTH STABILITY: PHYSICAL HEALTH: ON AVERAGE, HOW MANY MINUTES DO YOU ENGAGE IN EXERCISE AT THIS LEVEL?: 50 MIN

## 2024-02-20 ASSESSMENT — SOCIAL DETERMINANTS OF HEALTH (SDOH): HOW OFTEN DO YOU GET TOGETHER WITH FRIENDS OR RELATIVES?: MORE THAN THREE TIMES A WEEK

## 2024-02-20 NOTE — COMMUNITY RESOURCES LIST (ENGLISH)
02/20/2024   [facility]  N/A  For questions about this resource list or additional care needs, please contact your primary care clinic or care manager.  Phone: [phone]   Email: N/A   Address: [address] [place] [address]   Hours: N/A        Hotlines and Helplines       Hotline - Housing crisis  1  [facility] Distance: 5.24 miles      Phone/Virtual   [address] [place] [address]  Language: English  Hours: Mon - Sun Open 24 Hours   Phone: (198) 659-3659 Email: communications@[facility].org Website: https://[facility].org/oursaviourshousing/     2  [facility] (Main Office) Distance: 5.61 miles      Phone/Virtual   [address] [place] [address]  Language: English  Hours: Mon - Sun Open 24 Hours   Phone: (151) 502-4166 Email: info@[facility].org Website: http://[facility] [facility].org          Housing       Coordinated Entry access point  3  [facility] - Coordinated Access to Housing and Shelter ([facility]) - Coordinated Access - Coordinated Entry access point Distance: 3.04 miles      In-Person, Phone/Virtual   [address] [place] [address]  Language: English  Hours: Mon - Fri 8:00 AM - 4:30 PM  Fees: Free   Phone: (479) 752-4423 Website: https://www.[facility]./residents/assistance-support/assistance/housing-services-support     4  [facility] Distance: 4.33 miles      In-Person, Phone/Virtual   [address] [place] [address]  Language: English  Hours: Mon - Fri 8:30 AM - 4:30 PM  Fees: Free   Phone: (183) 371-1074 Email: info@[place].org Website: https://www.[facility].org/locations/[facility]/     Drop-in center or day shelter  5  [facility] Distance: 5.54 miles      In-Person   [address] [place] [address]  Language: English, Macanese, Croatian  Hours: Mon - Sat 7:00 AM - 3:00 PM  Fees: Free, Self  Pay   Phone: (835) 649-9114 Email: info@[employer] Website: https://www.[employer].org/locations/opportunity-center/     6  [facility] Distance: 5.65 miles      In-Person   [address] [place] [address]  Language: English  Hours: Mon - Fri 12:00 PM - 3:00 PM  Fees: Free   Phone: (894) 319-3796 Email: [employer] Website: http://[employer]/     Housing search assistance  7  [facility] - Online Housing Search Assistance Distance: 1.54 miles      Phone/Virtual   [address] [place] [address]  Language: English  Hours: Mon - Sun Open 24 Hours  Fees: Free   Phone: (316) 935-3756 Email: bruce@[employer] Website: https://[employer]/     8  [facility] - Mental Health Crisis Housing Search Assistance Distance: 3 miles      In-Person, Phone/Virtual   [address] [place] [address]  Language: English  Hours: Mon - Tue 8:00 AM - 4:30 PM , Wed 8:00 AM - 6:00 PM , Thu - Fri 8:00 AM - 4:30 PM  Fees: Free   Phone: (150) 438-2814 Email: [facility]. Website: https://www.[facility]./residents/health-medical/clinics-services/mental-health/adult-mental-health     Shelter for families  9  [facility] Distance: 19.07 miles      In-Person   [address] [place] [address]  Language: English  Hours: Mon - Fri 3:00 PM - 9:00 AM , Sat - Sun Open 24 Hours  Fees: Free   Phone: (671) 242-8320 Ext.1 Website: https://www.[employer].org/2020/07/03/emergency-family-shelter/     Shelter for individuals  10  [facility] - Coordinated Access to Housing and Shelter ([facility]) - Coordinated Access - Emergency housing Distance: 3.04 miles      In-Person, Phone/Virtual   [address] [place] [address]  Language: English  Hours: Mon - Fri 8:00 AM - 4:30 PM  Fees: Free   Phone: (246) 654-3544 Website:  https://www.[facility]./residents/assistance-support/assistance/housing-services-support     11  [facility] [place] [facility] [place] [facility] Distance: 4.3 miles      In-Person   [address] [place] [address]  Language: English  Hours: Mon - Sun 5:00 PM - 10:00 AM  Fees: Free, Self Pay   Phone: (946) 816-3294 Email: info@[employer] Website: https://www.[employer]/locations/[facility]/          Important Numbers & Websites       Emergency Services   911  [facility] Services   311  Poison Control   (661) 787-1141  Suicide Prevention Lifeline   (793) 602-5925 (TALK)  Child Abuse Hotline   (958) 227-3224 (4-A-Child)  Sexual Assault Hotline   (865) 620-3027 (HOPE)  National Runaway Safeline   (128) 333-2628 (RUNAWAY)  All-Options Talkline   (775) 844-5562  Substance Abuse Referral   (193) 360-6776 (HELP)

## 2024-02-21 NOTE — PROGRESS NOTES
2/22/2024    Virtual Visit Details  Type of service:  Video Visit   Originating Location (pt. Location): Home  Distant Location (provider location):  Off-site  Platform used for Video Visit: Margarito    Referring Provider:  Shabnam Bryant PA-C    Presenting Information:   I spoke to Анна today to discuss her genetic testing results. A saliva kit was sent to her home. The BRCA1 and BRCA2 genes with automatic reflex the Common Hereditary Cancers panel was ordered from [employer]. This testing was done because of Анна's family history of breast and prostate cancer.    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 No additional variants were found in the POLE gene.     Of note, this particular POLE variant that was found in Анна is NOT known to be associated with autosomal dominant PPAP (polymerase proofreading-associated polyposis) which is associated with increased risk for colon polyps and colon cancer. Autosomal dominant PPAP is associated with pathogenic missense variants in the 3 -5  exonuclease domain of the POLE gene, not loss-of-function variants as Анна was found to have.     Анна tested negative for variants in the following genes by sequencing and deletion/duplication analysis: APC, SARA, AXIN2, BARD1, BMPR1A, BRCA1, BRCA2, BRIP1, CDH1, CDK4, CDKN2A, CHEK2, CTNNA1, DICER1, EPCAM, GREM1, HOXB13, KIT, MEN1, MLH1, MSH2, MSH3, MSH6, MUTYH, NBN, NF1, NTHL1, PALB2, PDGFRA, PMS2, POLD1, PTEN, RAD50, RAD51C, RAD51D, SDHA, SDHB, SDHC, SDHD, SMAD4, SMARCA4, STK11, TP53,TSC1, TSC2, VHL. We reviewed the autosomal dominant inheritance of these genes. Анна cannot pass on a variant in any of these genes to her  "children based on this test result. variants in these genes do not skip generations.      A copy of the test report can be found in the Laboratory tab, dated 1/30/2024, and named \"LABORATORY MISCELLANEOUS ORDER\". The report is scanned in as a linked document.    Interpretation:    As discussed above, loss-of-function (LOF) variants in the POLE gene are associated with autosomal recessive RAISA syndrome. RAISA is a condition characterized by mild facial dysmorphism and immunodeficiency resulting in recurrent infections, congenital livedo on the skin, and short stature.    Having a pathogenic LOF variant in the POLE gene is NOT known to increase an individual's risk for cancer, and is NOT known to be at risk for autosomal dominant PPAP (polymerase proofreading-associated polyposis).     Since Анна has only one pathogenic LOF variant POLE variant she is considered a CARRIER for RAISA syndrome and does NOT require any additional cancer surveillance based on this genetic test result alone. Screening for Анна should be based on evaluation of her personal/family history as outlined below.      We discussed several different interpretations of this test result.    One explanation may be that there is a different gene or combination of genes and environment that are associated with the cancers in this family.  It is possible that her affected relatives did have a variant and she did not inherit it.  There is also a small possibility that there is a variant in one of these genes, and the testing laboratory could not find it with their current testing methods.      Screening Recommendations:   Other screening based on Анна's personal and family history:  Based on her personal and family history, Анна has a 28.4% lifetime risk of developing breast cancer based on the SILKE model. As such, Анна meets current National Comprehensive Cancer Network (NCCN) guidelines for high risk breast screening. This includes annual " breast MRI in addition to annual mammogram beginning at age 40. In addition, Анна should be receiving clinical breast exams by her physician. We discussed that Анна could participate in our Cancer Risk Management Program in which our nursing specialist provides an individual screening plan and assists with medical management. Анна planned to discuss high risk breast cancer screening with her primary care provider.   Other population cancer screening options, such as those recommended by the American Cancer Society and the National Comprehensive Cancer Network (NCCN), are also appropriate for Анна and her family. These screening recommendations may change if there are changes to Анна's personal and/or family history of cancer.   Final screening recommendations should be made by each individual's primary care provider.    Of note, the above information is based on our current understanding of Анна's genetic findings. Анна is encouraged to reach out to me regularly regarding any pertinent updates to her personal and/or family history of cancer, as our understanding of the genetic findings in her family may change over time.     Inheritance/Implications for Family Members:  We reviewed the autosomal recessive inheritance of RAISA syndrome. Individuals with RAISA syndrome have a LOF variant on one allele and a leaky variant on the other (meaning a reduced amount of wild type protein is produced). When both parents are carriers for RAISA syndrome they have a 25% chance of having a child with RAISA syndrome, a 50% chance of having a child who is a carrier of RAISA syndrome, and a 25% chance of having a child who is unaffected; not a carrier. These chances apply to each pregnancy. For individuals of childbearing age with POLE variants, genetic counseling and genetic testing may be advised for their partners.    We discussed that testing for POLE variants in Анна s ex- is available, which would be  helpful in determining risk for their children.  If Анна s ex- is a carrier, the above risks apply.  If he is not, their children would, at most, be carriers, and would just need to be tested for the variant found in Анна. If Анна s ex- chooses not to have testing, her children could consider analysis of the POLE gene to best clarify their risk at an appropriate age.     Анна s siblings can consider genetic counseling, as they have at least a 50% chance to carry the same POLE LOF variant identified in Анна. It will likely be recommended that they consider comprehensive testing of the POLE gene, though, as it is possible that they could have RAISA syndrome or carry a LOF variant in the POLE gene that is different than the one identified in Анна.     Additional Testing Considerations:  Even though Анна's genetic testing result was positive, other relatives may carry a different gene variant associated with cancer. Genetic counseling is recommended for Анна's siblings, mother, and other maternal relatives to discuss genetic testing options. If any of these relatives do pursue genetic testing, Анна is encouraged to contact me so that we may review the impact of their test results on her.    Plan:  1. Анна will receive a copy of her genetic test results in the mail.  2. She plans to follow up with her other providers.  3. She should contact me annually, or sooner if her family history changes.    If Анна has additional questions, I encouraged her to contact me directly at [phone].     Time spent over video: 25 minutes    Bella Adame MS, [facility]  Licensed, Certified Genetic Counselor  Phone: [phone]

## 2024-02-22 ENCOUNTER — VIRTUAL VISIT (OUTPATIENT)
Dept: ONCOLOGY | Facility: CLINIC | Age: 39
End: 2024-02-22
Payer: COMMERCIAL

## 2024-02-22 ENCOUNTER — OFFICE VISIT (OUTPATIENT)
Dept: PEDIATRICS | Facility: CLINIC | Age: 39
End: 2024-02-22
Payer: COMMERCIAL

## 2024-02-22 VITALS
SYSTOLIC BLOOD PRESSURE: 114 MMHG | DIASTOLIC BLOOD PRESSURE: 70 MMHG | RESPIRATION RATE: 20 BRPM | HEIGHT: 67 IN | HEART RATE: 81 BPM | BODY MASS INDEX: 26.6 KG/M2 | TEMPERATURE: 98.3 F | OXYGEN SATURATION: 99 % | WEIGHT: 169.5 LBS

## 2024-02-22 DIAGNOSIS — Z12.83 SKIN CANCER SCREENING: ICD-10-CM

## 2024-02-22 DIAGNOSIS — Z00.00 ROUTINE GENERAL MEDICAL EXAMINATION AT A HEALTH CARE FACILITY: Primary | ICD-10-CM

## 2024-02-22 DIAGNOSIS — Z11.3 SCREEN FOR STD (SEXUALLY TRANSMITTED DISEASE): ICD-10-CM

## 2024-02-22 DIAGNOSIS — Z80.8 FAMILY HISTORY OF SKIN CANCER: ICD-10-CM

## 2024-02-22 DIAGNOSIS — Z12.4 CERVICAL CANCER SCREENING: ICD-10-CM

## 2024-02-22 DIAGNOSIS — Z80.3 FAMILY HISTORY OF MALIGNANT NEOPLASM OF BREAST: ICD-10-CM

## 2024-02-22 DIAGNOSIS — Z80.42 FAMILY HISTORY OF PROSTATE CANCER: ICD-10-CM

## 2024-02-22 PROCEDURE — 96040 HC GENETIC COUNSELING, EACH 30 MINUTES: CPT | Mod: 95

## 2024-02-22 PROCEDURE — G0145 SCR C/V CYTO,THINLAYER,RESCR: HCPCS | Performed by: PHYSICIAN ASSISTANT

## 2024-02-22 PROCEDURE — 87491 CHLMYD TRACH DNA AMP PROBE: CPT | Performed by: PHYSICIAN ASSISTANT

## 2024-02-22 PROCEDURE — 99396 PREV VISIT EST AGE 40-64: CPT | Performed by: PHYSICIAN ASSISTANT

## 2024-02-22 PROCEDURE — 87624 HPV HI-RISK TYP POOLED RSLT: CPT | Performed by: PHYSICIAN ASSISTANT

## 2024-02-22 PROCEDURE — 87591 N.GONORRHOEAE DNA AMP PROB: CPT | Performed by: PHYSICIAN ASSISTANT

## 2024-02-22 ASSESSMENT — PAIN SCALES - GENERAL: PAINLEVEL: NO PAIN (0)

## 2024-02-22 NOTE — PATIENT INSTRUCTIONS
Preventive Care Advice   This is general advice given by our system to help you stay healthy. However, your care team may have specific advice just for you. Please talk to your care team about your preventive care needs.  Nutrition  Eat 5 or more servings of fruits and vegetables each day.  Try wheat bread, brown rice and whole grain pasta (instead of white bread, rice, and pasta).  Get enough calcium and vitamin D. Check the label on foods and aim for 100% of the RDA (recommended daily allowance).  Lifestyle  Exercise at least 150 minutes each week  (30 minutes a day, 5 days a week).  Do muscle strengthening activities 2 days a week. These help control your weight and prevent disease.  No smoking.  Wear sunscreen to prevent skin cancer.  Have a dental exam and cleaning every 6 months.  Yearly exams  See your health care team every year to talk about:  Any changes in your health.  Any medicines your care team has prescribed.  Preventive care, family planning, and ways to prevent chronic diseases.  Shots (vaccines)   HPV shots (up to age 26), if you've never had them before.  Hepatitis B shots (up to age 59), if you've never had them before.  COVID-19 shot: Get this shot when it's due.  Flu shot: Get a flu shot every year.  Tetanus shot: Get a tetanus shot every 10 years.  Pneumococcal, hepatitis A, and RSV shots: Ask your care team if you need these based on your risk.  Shingles shot (for age 50 and up)  General health tests  Diabetes screening:  Starting at age 35, Get screened for diabetes at least every 3 years.  If you are younger than age 35, ask your care team if you should be screened for diabetes.  Cholesterol test: At age 39, start having a cholesterol test every 5 years, or more often if advised.  Bone density scan (DEXA): At age 50, ask your care team if you should have this scan for osteoporosis (brittle bones).  Hepatitis C: Get tested at least once in your life.  STIs (sexually transmitted  infections)  Before age 24: Ask your care team if you should be screened for STIs.  After age 24: Get screened for STIs if you're at risk. You are at risk for STIs (including HIV) if:  You are sexually active with more than one person.  You don't use condoms every time.  You or a partner was diagnosed with a sexually transmitted infection.  If you are at risk for HIV, ask about PrEP medicine to prevent HIV.  Get tested for HIV at least once in your life, whether you are at risk for HIV or not.  Cancer screening tests  Cervical cancer screening: If you have a cervix, begin getting regular cervical cancer screening tests starting at age 21.  Breast cancer scan (mammogram): If you've ever had breasts, begin having regular mammograms starting at age 40. This is a scan to check for breast cancer.  Colon cancer screening: It is important to start screening for colon cancer at age 45.  Have a colonoscopy test every 10 years (or more often if you're at risk) Or, ask your provider about stool tests like a FIT test every year or Cologuard test every 3 years.  To learn more about your testing options, visit:   https://www.[employer]/562628.pdf.  For help making a decision, visit:   https://bit.ly/ag77779.  Prostate cancer screening test: If you have a prostate, ask your care team if a prostate cancer screening test (PSA) at age 55 is right for you.  Lung cancer screening: If you are a current or former smoker ages 50 to 80, ask your care team if ongoing lung cancer screenings are right for you.  For informational purposes only. Not to replace the advice of your health care provider. Copyright   2023 [facility]. All rights reserved. Clinically reviewed by the [facility]itions Program. [employer] [id number] - REV 01/24.    Learning About Stress  What is stress?     Stress is your body's response to a hard situation. Your body can have a physical, emotional, or mental response. Stress is a fact of life for  most people, and it affects everyone differently. What causes stress for you may not be stressful for someone else.  A lot of things can cause stress. You may feel stress when you go on a job interview, take a test, or run a race. This kind of short-term stress is normal and even useful. It can help you if you need to work hard or react quickly. For example, stress can help you finish an important job on time.  Long-term stress is caused by ongoing stressful situations or events. Examples of long-term stress include long-term health problems, ongoing problems at work, or conflicts in your family. Long-term stress can harm your health.  How does stress affect your health?  When you are stressed, your body responds as though you are in danger. It makes hormones that speed up your heart, make you breathe faster, and give you a burst of energy. This is called the fight-or-flight stress response. If the stress is over quickly, your body goes back to normal and no harm is done.  But if stress happens too often or lasts too long, it can have bad effects. Long-term stress can make you more likely to get sick, and it can make symptoms of some diseases worse. If you tense up when you are stressed, you may develop neck, shoulder, or low back pain. Stress is linked to high blood pressure and heart disease.  Stress also harms your emotional health. It can make you castaneda, tense, or depressed. Your relationships may suffer, and you may not do well at work or school.  What can you do to manage stress?  You can try these things to help manage stress:   Do something active. Exercise or activity can help reduce stress. Walking is a great way to get started. Even everyday activities such as housecleaning or yard work can help.  Try yoga or ned chi. These techniques combine exercise and meditation. You may need some training at first to learn them.  Do something you enjoy. For example, listen to music or go to a movie. Practice your  "hobby or do volunteer work.  Meditate. This can help you relax, because you are not worrying about what happened before or what may happen in the future.  Do guided imagery. Imagine yourself in any setting that helps you feel calm. You can use online videos, books, or a teacher to guide you.  Do breathing exercises. For example:  From a standing position, bend forward from the waist with your knees slightly bent. Let your arms dangle close to the floor.  Breathe in slowly and deeply as you return to a standing position. Roll up slowly and lift your head last.  Hold your breath for just a few seconds in the standing position.  Breathe out slowly and bend forward from the waist.  Let your feelings out. Talk, laugh, cry, and express anger when you need to. Talking with supportive friends or family, a counselor, or a ellis leader about your feelings is a healthy way to relieve stress. Avoid discussing your feelings with people who make you feel worse.  Write. It may help to write about things that are bothering you. This helps you find out how much stress you feel and what is causing it. When you know this, you can find better ways to cope.  What can you do to prevent stress?  You might try some of these things to help prevent stress:  Manage your time. This helps you find time to do the things you want and need to do.  Get enough sleep. Your body recovers from the stresses of the day while you are sleeping.  Get support. Your family, friends, and community can make a difference in how you experience stress.  Limit your news feed. Avoid or limit time on social media or news that may make you feel stressed.  Do something active. Exercise or activity can help reduce stress. Walking is a great way to get started.  Where can you learn more?  Go to https://www.healthwise.net/patiented  Enter N032 in the search box to learn more about \"Learning About Stress.\"  Current as of: February 26, 2023               Content Version: " 13.8    3927-3288 [employer].   Care instructions adapted under license by your healthcare professional. If you have questions about a medical condition or this instruction, always ask your healthcare professional. [employer] disclaims any warranty or liability for your use of this information.      Learning About Alcohol Use Disorder  What is alcohol use disorder?  Alcohol use disorder means that a person drinks alcohol even though it causes harm to themselves or others. It can range from mild to severe. The more symptoms of this disorder you have, the more severe it may be. People who have it may find it hard to control their use of alcohol.  People who have this disorder may argue with others about how much they're drinking. Their job may be affected because of drinking. They may drink when it's dangerous or illegal, such as when they drive. They also may have a strong need, or craving, to drink. They may feel like they must drink just to get by. Their drinking may increase their risk of getting hurt or being in a car crash.  Over time, drinking too much alcohol may cause health problems. These may include high blood pressure, liver problems, or problems with digestion.  What are the symptoms?  Maybe you've wondered about your alcohol habits or how to tell if your drinking is becoming a problem.  Here are some of the symptoms of alcohol use disorder. You may have it if you have two or more of the following symptoms:  You drink larger amounts of alcohol than you ever meant to. Or you've been drinking for a longer time than you ever meant to.  You can't cut down or control your use. Or you constantly wish you could cut down.  You spend a lot of time getting or drinking alcohol or recovering from its effects.  You have strong cravings for alcohol.  You can no longer do your main jobs at work, at school, or at home.  You keep drinking alcohol, even though your use hurts your  relationships.  You have stopped doing important activities because of your alcohol use.  You drink alcohol in situations where doing so is dangerous.  You keep drinking alcohol even though you know it's causing health problems.  You need more and more alcohol to get the same effect, or you get less effect from the same amount over time. This is called tolerance.  You have uncomfortable symptoms when you stop drinking alcohol or use less. This is called withdrawal.  Alcohol use disorder can range from mild to severe. The more symptoms you have, the more severe the disorder may be.  You might not realize that your drinking is a problem. You might not drink large amounts when you drink. Or you might go for days or weeks between drinking episodes. But even if you don't drink very often, your drinking could still be harmful and put you at risk.  How is alcohol use disorder treated?  Getting help is up to you. But you don't have to do it alone. There are many people and kinds of treatments that can help.  Treatment for alcohol use disorder can include:  Group therapy, one or more types of counseling, and alcohol education.  Medicines that help to:  Reduce withdrawal symptoms and help you safely stop drinking.  Reduce cravings for alcohol.  Support groups. These groups include Alcoholics Anonymous and [employer] (Self-Management and Recovery Training).  Some people are able to stop or cut back on drinking with help from a counselor. People who have moderate to severe alcohol use disorder may need medical treatment. They may need to stay in a hospital or treatment center.  You may have a treatment team to help you. This team may include a psychologist or psychiatrist, counselors, doctors, social workers, nurses, and a . A  helps plan and manage your treatment.  Follow-up care is a key part of your treatment and safety. Be sure to make and go to all appointments, and call your doctor if you are  "having problems. It's also a good idea to know your test results and keep a list of the medicines you take.  Where can you learn more?  Go to https://www.[employer].net/patiented  Enter H758 in the search box to learn more about \"Learning About Alcohol Use Disorder.\"  Current as of: March 21, 2023               Content Version: 13.8    1685-9413 [employer].   Care instructions adapted under license by your healthcare professional. If you have questions about a medical condition or this instruction, always ask your healthcare professional. [employer] disclaims any warranty or liability for your use of this information.      Safer Sex: Care Instructions  Overview  Safer sex is a way to reduce your risk of getting a sexually transmitted infection (STI). It can also help prevent pregnancy.  Several products can help you practice safer sex and reduce your chance of STIs. One of the best is a condom. There are internal and external condoms. You can use a special rubber sheet (dental dam) for protection during oral sex. Disposable gloves can keep your hands from touching blood, semen, or other body fluids that can carry infections.  Remember that birth control methods such as diaphragms, IUDs, foams, and birth control pills do not stop you from getting STIs.  Follow-up care is a key part of your treatment and safety. Be sure to make and go to all appointments, and call your doctor if you are having problems. It's also a good idea to know your test results and keep a list of the medicines you take.  How can you care for yourself at home?  Think about getting vaccinated to help prevent hepatitis A, hepatitis B, and human papillomavirus (HPV). They can be spread through sex.  Use a condom every time you have sex. Use an external condom, which goes on the penis. Or use an internal condom, which goes into the vagina or anus.  Make sure you use the right size external condom. A condom that's too small " "can break easily. A condom that's too big can slip off during sex.  Use a new condom each time you have sex. Be careful not to poke a hole in the condom when you open the wrapper.  Don't use an internal condom and an external condom at the same time.  Never use petroleum jelly (such as Vaseline), grease, hand lotion, baby oil, or anything with oil in it. These products can make holes in the condom.  After intercourse, hold the edge of the condom as you remove it. This will help keep semen from spilling out of the condom.  Do not have sex with anyone who has symptoms of an STI, such as sores on the genitals or mouth.  Do not drink a lot of alcohol or use drugs before sex.  Limit your sex partners. Sex with one partner who has sex only with you can reduce your risk of getting an STI.  Don't share sex toys. But if you do share them, use a condom and clean the sex toys between each use.  Talk to your partner(s) before you have sex. Talk about what you feel comfortable with and whether you have any boundaries with sex. And find out if your partner(s) may be at risk for any STI. Keep in mind that a person may be able to spread an STI even if they do not have symptoms. You and your partner(s) may want to get tested for STIs.  Where can you learn more?  Go to https://www.[employer].net/patiented  Enter B608 in the search box to learn more about \"Safer Sex: Care Instructions.\"  Current as of: April 19, 2023               Content Version: 13.8    5438-8664 [employer].   Care instructions adapted under license by your healthcare professional. If you have questions about a medical condition or this instruction, always ask your healthcare professional. [employer] disclaims any warranty or liability for your use of this information.      "

## 2024-02-22 NOTE — LETTER
Cancer Risk Management  Program Locations    [facility]  [facility]  [facility]  [facility]  Mailing Address  Cancer Risk Management Program  [facility]  [address]  [place] [address]    New patient appointments  [phone]    February 22, 2024    Анна Alexander  [address] [place] [address]    Dear Анна,    It was a pleasure talking with you via your virtual genetic counseling visit on 2/22/2024.  Below is a copy of the progress note from that recent visit through the Cancer Risk Management Program.  If you have any additional questions, please feel free to contact me.    Referring Provider:  Shabnam Bryant PA-C    Presenting Information:   I spoke to Анна today to discuss her genetic testing results. A saliva kit was sent to her home. The BRCA1 and BRCA2 genes with automatic reflex the Common Hereditary Cancers panel was ordered from [facility]. This testing was done because of Анна's family history of breast and prostate cancer.    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 No additional variants were found in the POLE gene.     Of note, this particular POLE variant that was found in Анна is NOT known to be associated with autosomal dominant PPAP (polymerase proofreading-associated polyposis) which is associated with increased risk for colon polyps and colon cancer. Autosomal dominant PPAP is associated with pathogenic missense variants in the 3 -5  exonuclease domain of the POLE gene, not loss-of-function variants as Анна  "was found to have.     Анна tested negative for variants in the following genes by sequencing and deletion/duplication analysis: APC, SARA, AXIN2, BARD1, BMPR1A, BRCA1, BRCA2, BRIP1, CDH1, CDK4, CDKN2A, CHEK2, CTNNA1, DICER1, EPCAM, GREM1, HOXB13, KIT, MEN1, MLH1, MSH2, MSH3, MSH6, MUTYH, NBN, NF1, NTHL1, PALB2, PDGFRA, PMS2, POLD1, PTEN, RAD50, RAD51C, RAD51D, SDHA, SDHB, SDHC, SDHD, SMAD4, SMARCA4, STK11, TP53,TSC1, TSC2, VHL. We reviewed the autosomal dominant inheritance of these genes. Анна cannot pass on a variant in any of these genes to her children based on this test result. variants in these genes do not skip generations.      A copy of the test report can be found in the Laboratory tab, dated 1/30/2024, and named \"LABORATORY MISCELLANEOUS ORDER\". The report is scanned in as a linked document.    Interpretation:    As discussed above, loss-of-function (LOF) variants in the POLE gene are associated with autosomal recessive RAISA syndrome. RAISA is a condition characterized by mild facial dysmorphism and immunodeficiency resulting in recurrent infections, congenital livedo on the skin, and short stature.    Having a pathogenic LOF variant in the POLE gene is NOT known to increase an individual's risk for cancer, and is NOT known to be at risk for autosomal dominant PPAP (polymerase proofreading-associated polyposis).     Since Анна has only one pathogenic LOF variant POLE variant she is considered a CARRIER for RAISA syndrome and does NOT require any additional cancer surveillance based on this genetic test result alone. Screening for Анна should be based on evaluation of her personal/family history as outlined below.      We discussed several different interpretations of this test result.    One explanation may be that there is a different gene or combination of genes and environment that are associated with the cancers in this family.  It is possible that her affected relatives did have a variant " and she did not inherit it.  There is also a small possibility that there is a variant in one of these genes, and the testing laboratory could not find it with their current testing methods.      Screening Recommendations:   Other screening based on Анна's personal and family history:  Based on her personal and family history, Анна has a 28.4% lifetime risk of developing breast cancer based on the SILKE model. As such, Анна meets current National Comprehensive Cancer Network (NCCN) guidelines for high risk breast screening. This includes annual breast MRI in addition to annual mammogram beginning at age 40. In addition, Анна should be receiving clinical breast exams by her physician. We discussed that Анна could participate in our Cancer Risk Management Program in which our nursing specialist provides an individual screening plan and assists with medical management. Анна planned to discuss high risk breast cancer screening with her primary care provider.   Other population cancer screening options, such as those recommended by the American Cancer Society and the National Comprehensive Cancer Network (NCCN), are also appropriate for Анна and her family. These screening recommendations may change if there are changes to Анна's personal and/or family history of cancer.   Final screening recommendations should be made by each individual's primary care provider.    Of note, the above information is based on our current understanding of Анна's genetic findings. Анна is encouraged to reach out to me regularly regarding any pertinent updates to her personal and/or family history of cancer, as our understanding of the genetic findings in her family may change over time.     Inheritance/Implications for Family Members:  We reviewed the autosomal recessive inheritance of RAISA syndrome. Individuals with RAISA syndrome have a LOF variant on one allele and a leaky variant on the other (meaning a  reduced amount of wild type protein is produced). When both parents are carriers for RAISA syndrome they have a 25% chance of having a child with RAISA syndrome, a 50% chance of having a child who is a carrier of RAISA syndrome, and a 25% chance of having a child who is unaffected; not a carrier. These chances apply to each pregnancy. For individuals of childbearing age with POLE variants, genetic counseling and genetic testing may be advised for their partners.    We discussed that testing for POLE variants in Анна s ex- is available, which would be helpful in determining risk for their children.  If Анна s ex- is a carrier, the above risks apply.  If he is not, their children would, at most, be carriers, and would just need to be tested for the variant found in Анна. If Анна s ex- chooses not to have testing, her children could consider analysis of the POLE gene to best clarify their risk at an appropriate age.     Анна s siblings can consider genetic counseling, as they have at least a 50% chance to carry the same POLE LOF variant identified in Анна. It will likely be recommended that they consider comprehensive testing of the POLE gene, though, as it is possible that they could have RAISA syndrome or carry a LOF variant in the POLE gene that is different than the one identified in Анна.     Additional Testing Considerations:  Even though Анна's genetic testing result was positive, other relatives may carry a different gene variant associated with cancer. Genetic counseling is recommended for Анна's siblings, mother, and other maternal relatives to discuss genetic testing options. If any of these relatives do pursue genetic testing, Анна is encouraged to contact me so that we may review the impact of their test results on her.    Plan:  1. Анна will receive a copy of her genetic test results in the mail.  2. She plans to follow up with her other providers.  3. She  should contact me annually, or sooner if her family history changes.    If Анна has additional questions, I encouraged her to contact me directly at [phone].     Time spent over video: 25 minutes    Bella Adame MS, [facility] [place]  Licensed, Certified Genetic Counselor  Phone: [phone]

## 2024-02-22 NOTE — NURSING NOTE
Is the patient currently in the state of [place]? YES    Visit mode:VIDEO    If the visit is dropped, the patient can be reconnected by: VIDEO VISIT: Text to cell phone:   Telephone Information:   Mobile [phone]       Will anyone else be joining the visit? NO  (If patient encounters technical issues they should call [phone])    How would you like to obtain your AVS? MyChart    Are changes needed to the allergy or medication list? N/A    Reason for visit: DIAMOND ROQUE

## 2024-02-22 NOTE — PROGRESS NOTES
"Preventive Care Visit  [facility] JENNIFER Bryant PA-C, Physician Assistant  Feb 22, 2024    Assessment & Plan       Routine preventative exam    Continue to work on heart healthy diet and exercise.   Can have nexplanon removed and IUD if she desires. Discussed side effect profile.   Cervical cancer screening    - Pap Screen with HPV - recommended age 30 - 65 years  - HPV Hold (Lab Only)  - HPV High Risk Types DNA Cervical    Screen for STD (sexually transmitted disease)    - Chlamydia & Gonorrhea by PCR, [facility] - Clinic Collect    Skin cancer screening    - Adult Dermatology  Referral                BMI  Estimated body mass index is 26.55 kg/m  as calculated from the following:    Height as of this encounter: 1.702 m (5' 7\").    Weight as of this encounter: 76.9 kg (169 lb 8 oz).       Counseling  Appropriate preventive services were discussed with this patient, including applicable screening as appropriate for fall prevention, nutrition, physical activity, Tobacco-use cessation, weight loss and cognition.  Checklist reviewing preventive services available has been given to the patient.          Mary Jj is a 38 year old, presenting for the following:  Physical        2/22/2024     1:55 PM   Additional Questions   Roomed by Toby Lynn   Accompanied by N/A         2/22/2024     1:55 PM   Patient Reported Additional Medications   Patient reports taking the following new medications No        Health Care Directive  Patient does not have a Health Care Directive or Living Will: Discussed advance care planning with patient; information given to patient to review.    HPI  Would like to talk about birthcontrol and skin change.            2/20/2024   General Health   How would you rate your overall physical health? (!) FAIR   Feel stress (tense, anxious, or unable to sleep) Only a little   (!) STRESS CONCERN      2/20/2024   Nutrition   Three or more servings of calcium each " day? Yes   Diet: Regular (no restrictions)    Vegetarian/vegan   How many servings of fruit and vegetables per day? 4 or more   How many sweetened beverages each day? 0-1         2/20/2024   Exercise   Days per week of moderate/strenous exercise 3 days   Average minutes spent exercising at this level 50 min         2/20/2024   Social Factors   Frequency of gathering with friends or relatives More than three times a week   Worry food won't last until get money to buy more No   Food not last or not have enough money for food? No   Do you have housing?  No   Are you worried about losing your housing? No   Lack of transportation? No   Unable to get utilities (heat,electricity)? No   Want help with housing or utility concern? No   (!) HOUSING CONCERN PRESENT      2/20/2024   Dental   Dentist two times every year? Yes         2/20/2024   TB Screening   Were you born outside of [place]?  No         Today's PHQ-2 Score:       2/22/2024     1:46 PM   PHQ-2 ( 1999 [employer])   Q1: Little interest or pleasure in doing things 0   Q2: Feeling down, depressed or hopeless 0   PHQ-2 Score 0   Q1: Little interest or pleasure in doing things Not at all   Q2: Feeling down, depressed or hopeless Not at all   PHQ-2 Score 0           2/20/2024   Substance Use   Alcohol more than 3/day or more than 7/wk Yes   How often do you have a drink containing alcohol 2 to 3 times a week   How many alcohol drinks on typical day 1 or 2   How often do you have 5+ drinks at one occasion Monthly   Audit 2/3 Score 2   How often not able to stop drinking once started Never   How often failed to do what normally expected Never   How often needed first drink in am after a heavy drinking session Never   How often feeling of guilt or remorse after drinking Never   How often unable to remember what happened the night before Never   Have you or someone else been injured because of your drinking No   Has anyone been concerned or suggested you cut down on drinking No  "  TOTAL SCORE - AUDIT 5   Do you use any other substances recreationally? No     Social History     Tobacco Use    Smoking status: Never    Smokeless tobacco: Never   Vaping Use    Vaping Use: Never used   Substance Use Topics    Alcohol use: Not Currently     Comment: 16 per week.    Drug use: Never           2/17/2023   LAST FHS-7 RESULTS   1st degree relative breast or ovarian cancer Yes   Any relative bilateral breast cancer Unknown   Any male have breast cancer No   Any woman have breast and ovarian cancer Yes   Any woman with breast cancer before 50yrs Yes   2 or more relatives with breast and/or ovarian cancer Yes   2 or more relatives with breast and/or bowel cancer Yes                2/20/2024   STI Screening   New sexual partner(s) since last STI/HIV test? (!) YES      History of abnormal Pap smear:         Latest Ref Rng & Units 2/21/2023     2:32 PM 2/11/2021     1:35 PM   PAP / HPV   PAP  Negative for Intraepithelial Lesion or Malignancy (NILM)     PAP (Historical)   NIL    HPV 16 DNA Negative Negative  Negative    HPV 18 DNA Negative Negative  Negative    Other HR HPV Negative Negative  Positive      ASCVD Risk   The 10-year ASCVD risk score (Binta CUEVAS, et al., 2019) is: 0.2%    Values used to calculate the score:      Age: 38 years      Sex: Female      Is Non- : No      Diabetic: No      Tobacco smoker: No      Systolic Blood Pressure: 114 mmHg      Is BP treated: No      HDL Cholesterol: 96 mg/dL      Total Cholesterol: 209 mg/dL        2/20/2024   Contraception/Family Planning   Questions about contraception or family planning (!) YES         Reviewed and updated as needed this visit by Provider       Med Hx  Surg Hx  Fam Hx                     Objective    Exam  /70 (BP Location: Right arm, Patient Position: Sitting, Cuff Size: Adult Regular)   Pulse 81   Temp 98.3  F (36.8  C) (Tympanic)   Resp 20   Ht 1.702 m (5' 7\")   Wt 76.9 kg (169 lb 8 oz)   " "SpO2 99%   BMI 26.55 kg/m     Estimated body mass index is 26.55 kg/m  as calculated from the following:    Height as of this encounter: 1.702 m (5' 7\").    Weight as of this encounter: 76.9 kg (169 lb 8 oz).    Physical Exam  GENERAL: alert and no distress  EYES: Eyes grossly normal to inspection, PERRL and conjunctivae and sclerae normal  HENT: ear canals and TM's normal, nose and mouth without ulcers or lesions  NECK: no adenopathy, no asymmetry, masses, or scars  RESP: lungs clear to auscultation - no rales, rhonchi or wheezes  CV: regular rate and rhythm, normal S1 S2, no S3 or S4, no murmur, click or rub, no peripheral edema  ABDOMEN: soft, nontender, no hepatosplenomegaly, no masses and bowel sounds normal   (female): normal female external genitalia, normal urethral meatus, normal vaginal mucosa  MS: no gross musculoskeletal defects noted, no edema  SKIN: no suspicious lesions or rashes  NEURO: Normal strength and tone, mentation intact and speech normal  PSYCH: mentation appears normal, affect normal/bright      Signed Electronically by: Shabnam Bryant PA-C    "

## 2024-02-22 NOTE — Clinical Note
2/22/2024         RE: Анна Alexander  [address] [place] [address]        Dear Colleague,    Thank you for referring your patient, Анна Alexander, to the [facility]. Please see a copy of my visit note below.    2/22/2024    Virtual Visit Details  Type of service:  Video Visit   Originating Location (pt. Location): Home  Distant Location (provider location):  Off-site  Platform used for Video Visit: Margarito    Referring Provider:  Shabnam Bryant PA-C    Presenting Information:   I spoke to Анна today to discuss her genetic testing results. A saliva kit was sent to her home. The BRCA1 and BRCA2 genes with automatic reflex the Common Hereditary Cancers panel was ordered from [facility]. This testing was done because of Анна's family history of breast and prostate cancer.    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 No additional variants were found in the POLE gene.     Of note, this particular POLE variant that was found in Анна is NOT known to be associated with autosomal dominant PPAP (polymerase proofreading-associated polyposis) which is associated with increased risk for colon polyps and colon cancer. Autosomal dominant PPAP is associated with pathogenic missense variants in the 3 -5  exonuclease domain of the POLE gene, not loss-of-function variants as Анна was found to have.     Анна tested negative for variants in the following genes by sequencing and deletion/duplication analysis: APC, SARA, AXIN2, BARD1, BMPR1A, BRCA1, BRCA2, BRIP1, CDH1, CDK4, CDKN2A, CHEK2, CTNNA1, DICER1, EPCAM, GREM1, HOXB13, KIT, MEN1, MLH1, MSH2, MSH3, MSH6, MUTYH, NBN, NF1, NTHL1,  "PALB2, PDGFRA, PMS2, POLD1, PTEN, RAD50, RAD51C, RAD51D, SDHA, SDHB, SDHC, SDHD, SMAD4, SMARCA4, STK11, TP53,TSC1, TSC2, VHL. We reviewed the autosomal dominant inheritance of these genes. Анна cannot pass on a variant in any of these genes to her children based on this test result. variants in these genes do not skip generations.      A copy of the test report can be found in the Laboratory tab, dated 1/30/2024, and named \"LABORATORY MISCELLANEOUS ORDER\". The report is scanned in as a linked document.    Interpretation:    As discussed above, loss-of-function (LOF) variants in the POLE gene are associated with autosomal recessive RAISA syndrome. RAISA is a condition characterized by mild facial dysmorphism and immunodeficiency resulting in recurrent infections, congenital livedo on the skin, and short stature.    Having a pathogenic LOF variant in the POLE gene is NOT known to increase an individual's risk for cancer, and is NOT known to be at risk for autosomal dominant PPAP (polymerase proofreading-associated polyposis).     Since Анна has only one pathogenic LOF variant POLE variant she is considered a CARRIER for RAISA syndrome and does NOT require any additional cancer surveillance based on this genetic test result alone. Screening for Анна should be based on evaluation of her personal/family history as outlined below.      We discussed several different interpretations of this test result.    One explanation may be that there is a different gene or combination of genes and environment that are associated with the cancers in this family.  It is possible that her affected relatives did have a variant and she did not inherit it.  There is also a small possibility that there is a variant in one of these genes, and the testing laboratory could not find it with their current testing methods.      Screening Recommendations:   Other screening based on Анна's personal and family history:  Based on her " personal and family history, Анна has a 28.4% lifetime risk of developing breast cancer based on the SILKE model. As such, Анна meets current National Comprehensive Cancer Network (NCCN) guidelines for high risk breast screening. This includes annual breast MRI in addition to annual mammogram beginning at age 40. In addition, Анна should be receiving clinical breast exams by her physician. We discussed that Анна could participate in our Cancer Risk Management Program in which our nursing specialist provides an individual screening plan and assists with medical management. Анна planned to discuss high risk breast cancer screening with her primary care provider.   Other population cancer screening options, such as those recommended by the American Cancer Society and the National Comprehensive Cancer Network (NCCN), are also appropriate for Анна and her family. These screening recommendations may change if there are changes to Анна's personal and/or family history of cancer.   Final screening recommendations should be made by each individual's primary care provider.    Of note, the above information is based on our current understanding of Анна's genetic findings. Анна is encouraged to reach out to me regularly regarding any pertinent updates to her personal and/or family history of cancer, as our understanding of the genetic findings in her family may change over time.     Inheritance/Implications for Family Members:  We reviewed the autosomal recessive inheritance of RAISA syndrome. Individuals with RAISA syndrome have a LOF variant on one allele and a leaky variant on the other (meaning a reduced amount of wild type protein is produced). When both parents are carriers for RAISA syndrome they have a 25% chance of having a child with RAISA syndrome, a 50% chance of having a child who is a carrier of RAISA syndrome, and a 25% chance of having a child who is unaffected; not a carrier. These chances  apply to each pregnancy. For individuals of childbearing age with POLE variants, genetic counseling and genetic testing may be advised for their partners.    We discussed that testing for POLE variants in Анна s ex- is available, which would be helpful in determining risk for their children.  If Анна s ex- is a carrier, the above risks apply.  If he is not, their children would, at most, be carriers, and would just need to be tested for the variant found in Анна. If Анна s ex- chooses not to have testing, her children could consider analysis of the POLE gene to best clarify their risk at an appropriate age.     Анна s siblings can consider genetic counseling, as they have at least a 50% chance to carry the same POLE LOF variant identified in Анна. It will likely be recommended that they consider comprehensive testing of the POLE gene, though, as it is possible that they could have RAISA syndrome or carry a LOF variant in the POLE gene that is different than the one identified in Анна.     Additional Testing Considerations:  Even though Анна's genetic testing result was positive, other relatives may carry a different gene variant associated with cancer. Genetic counseling is recommended for Анна's siblings, mother, and other maternal relatives to discuss genetic testing options. If any of these relatives do pursue genetic testing, Анна is encouraged to contact me so that we may review the impact of their test results on her.    Plan:  1. Анна will receive a copy of her genetic test results in the mail.  2. She plans to follow up with her other providers.  3. She should contact me annually, or sooner if her family history changes.    If Анна has additional questions, I encouraged her to contact me directly at [phone].     Time spent over video: 25 minutes    Bella Adame MS, [facility]  Licensed, Certified Genetic Counselor  Phone: [phone]      Again, thank  you for allowing me to participate in the care of your patient.        Sincerely,        Bella Adame GC

## 2024-02-23 LAB
C TRACH DNA SPEC QL PROBE+SIG AMP: NEGATIVE
N GONORRHOEA DNA SPEC QL NAA+PROBE: NEGATIVE

## 2024-02-27 LAB
BKR LAB AP GYN ADEQUACY: NORMAL
BKR LAB AP GYN INTERPRETATION: NORMAL
BKR LAB AP HPV REFLEX: NORMAL
BKR LAB AP PREVIOUS ABNORMAL: NORMAL
PATH REPORT.COMMENTS IMP SPEC: NORMAL
PATH REPORT.COMMENTS IMP SPEC: NORMAL
PATH REPORT.RELEVANT HX SPEC: NORMAL

## 2024-03-25 ENCOUNTER — OFFICE VISIT (OUTPATIENT)
Dept: OBGYN | Facility: CLINIC | Age: 39
End: 2024-03-25
Payer: COMMERCIAL

## 2024-03-25 VITALS — BODY MASS INDEX: 26.33 KG/M2 | DIASTOLIC BLOOD PRESSURE: 84 MMHG | WEIGHT: 168.1 LBS | SYSTOLIC BLOOD PRESSURE: 114 MMHG

## 2024-03-25 DIAGNOSIS — N93.9 ABNORMAL UTERINE BLEEDING (AUB): Primary | ICD-10-CM

## 2024-03-25 DIAGNOSIS — Z30.46 ENCOUNTER FOR NEXPLANON REMOVAL: ICD-10-CM

## 2024-03-25 DIAGNOSIS — R79.89 ELEVATED PROLACTIN LEVEL: ICD-10-CM

## 2024-03-25 DIAGNOSIS — Z30.430 ENCOUNTER FOR INSERTION OF INTRAUTERINE CONTRACEPTIVE DEVICE: ICD-10-CM

## 2024-03-25 LAB
HBA1C MFR BLD: 5 % (ref 0–5.6)
HGB BLD-MCNC: 12.9 G/DL (ref 11.7–15.7)
PROLACTIN SERPL 3RD IS-MCNC: 119 NG/ML (ref 5–23)
TSH SERPL DL<=0.005 MIU/L-ACNC: 2.04 UIU/ML (ref 0.3–4.2)

## 2024-03-25 PROCEDURE — 11982 REMOVE DRUG IMPLANT DEVICE: CPT | Performed by: STUDENT IN AN ORGANIZED HEALTH CARE EDUCATION/TRAINING PROGRAM

## 2024-03-25 PROCEDURE — 58999 UNLISTED PX FML GENITAL SYS: CPT | Mod: 51 | Performed by: STUDENT IN AN ORGANIZED HEALTH CARE EDUCATION/TRAINING PROGRAM

## 2024-03-25 PROCEDURE — 58100 BIOPSY OF UTERUS LINING: CPT | Mod: 51 | Performed by: STUDENT IN AN ORGANIZED HEALTH CARE EDUCATION/TRAINING PROGRAM

## 2024-03-25 PROCEDURE — 84443 ASSAY THYROID STIM HORMONE: CPT | Performed by: STUDENT IN AN ORGANIZED HEALTH CARE EDUCATION/TRAINING PROGRAM

## 2024-03-25 PROCEDURE — 83036 HEMOGLOBIN GLYCOSYLATED A1C: CPT | Performed by: STUDENT IN AN ORGANIZED HEALTH CARE EDUCATION/TRAINING PROGRAM

## 2024-03-25 PROCEDURE — 36415 COLL VENOUS BLD VENIPUNCTURE: CPT | Performed by: STUDENT IN AN ORGANIZED HEALTH CARE EDUCATION/TRAINING PROGRAM

## 2024-03-25 PROCEDURE — 84146 ASSAY OF PROLACTIN: CPT | Performed by: STUDENT IN AN ORGANIZED HEALTH CARE EDUCATION/TRAINING PROGRAM

## 2024-03-25 PROCEDURE — 99204 OFFICE O/P NEW MOD 45 MIN: CPT | Mod: 25 | Performed by: STUDENT IN AN ORGANIZED HEALTH CARE EDUCATION/TRAINING PROGRAM

## 2024-03-25 PROCEDURE — 85018 HEMOGLOBIN: CPT | Performed by: STUDENT IN AN ORGANIZED HEALTH CARE EDUCATION/TRAINING PROGRAM

## 2024-03-25 PROCEDURE — 88305 TISSUE EXAM BY PATHOLOGIST: CPT | Performed by: STUDENT IN AN ORGANIZED HEALTH CARE EDUCATION/TRAINING PROGRAM

## 2024-03-25 NOTE — PATIENT INSTRUCTIONS
IUD information:     Benefits: The IUD can be 97-99% effective when carefully following directions regarding use. It can be more effective if used with additional contraception. IUD containing progestin may decrease menstrual flow and menstrual cramping.     Risks/Side Effects: include but are not limited to spotting, bleeding, hemorrhage, or anemia: cramping or pain: partial or complete expulsion of device; lost IUD strings; uterine or cervical perforation; embedding of IUD in the uterine wall; increased risk of pelvic inflammatory disease. Women who become pregnant with an IUD in place are at a higher risk for ectopic pregnancy should a pregnancy occur with an IUD in situ. There is a higher rate of miscarriage when pregnancy occurs with IUD in place.    Warning signs: Please call clinic if you have abnormal spotting or heavy bleeding, abdominal pain, dyspareunia, fever, chills, flu like symptoms, or unable to locate strings of IUD, or strings are longer or shorter than expected.    You are encouraged to use condoms for prevention of STD. You may also need back up contraception for 7 days with your IUD. You may use pain medications (ibuprofen) as needed for mild to moderate pain. Please follow-up in clinic in 4-6 weeks for IUD string check if unable to find strings or as directed by provider.    IUD information:     Benefits: The IUD can be 97-99% effective when carefully following directions regarding use. It can be more effective if used with additional contraception. IUD containing progestin may decrease menstrual flow and menstrual cramping.     Risks/Side Effects: include but are not limited to spotting, bleeding, hemorrhage, or anemia: cramping or pain: partial or complete expulsion of device; lost IUD strings; uterine or cervical perforation; embedding of IUD in the uterine wall; increased risk of pelvic inflammatory disease. Women who become pregnant with an IUD in place are at a higher risk for ectopic  pregnancy should a pregnancy occur with an IUD in situ. There is a higher rate of miscarriage when pregnancy occurs with IUD in place.    Warning signs: Please call clinic if you have abnormal spotting or heavy bleeding, abdominal pain, dyspareunia, fever, chills, flu like symptoms, or unable to locate strings of IUD, or strings are longer or shorter than expected.    You are encouraged to use condoms for prevention of STD. You may also need back up contraception for 7 days with your IUD. You may use pain medications (ibuprofen) as needed for mild to moderate pain. Please follow-up in clinic in 4-6 weeks for IUD string check if unable to find strings or as directed by provider.

## 2024-03-25 NOTE — PROGRESS NOTES
"[facility]  Gynecology Office Visit    CC: AUB    S:  Анна Alexander is a 38 year old  who presents for the above. She is here alone.    - Long history of AUB characterized by HMB.   - Nexplanon placed in  and has not been very happy with this.  - Bleeding unpredictable: can happen anywhere from every 2 weeks to every 6 weeks  - Sometimes spotting, sometimes extremely heavy  - Lasts anywhere from 1 to 10 days  - Cramping did improve with Nexplanon  - Noted weight gain right away after Nexplanon and increase in breast size  - She would like Nexplanon out and wants Mirena IUD    Menses before Nexplanon:  - 26-28 days  - Every 3rd period \"knock me out\" with bleeding and cramping  - Cramping always pretty bad  - Bleeding or 5 days - moderate  - No missed or bleeding between periods  - No postcoital bleeding    Has not had full AUB w/up or pelvic US    Family history of breast cancer  Saw genetic counseling - BRCA negative    OB History:  -     Gyn History:  - LMP: No LMP recorded. Patient has had an implant.   - Menses: See above  - Contraception: Nexplanon  - STI history: Chlamydia a few years ago  - Last pap: NILM HPV neg     Past Medical History:  Past Medical History:   Diagnosis Date    Cervical high risk HPV (human papillomavirus) test positive 2021    History of blood transfusion 2014    Blood loss from child birth       Problem List:  Patient Active Problem List   Diagnosis    Cervical high risk HPV (human papillomavirus) test positive    BMI 26.0-26.9,adult    Family history of malignant neoplasm of breast    Hair loss       Past Surgical History:  Past Surgical History:   Procedure Laterality Date    COSMETIC SURGERY      Ears were pinned back       Home Medications:  Current Outpatient Medications   Medication Instructions    etonogestrel (NEXPLANON) 68 MG IMPL 1 each, ONCE       Family History:  Family History   Problem Relation Age of Onset "    Breast Cancer Mother 55        55    Cardiovascular Mother         bicuspid aortic valve    Osteoporosis Mother     Hypertension Father     Memory loss Father     No Known Problems Sister     No Known Problems Brother     No Known Problems Brother     No Known Problems Brother     Breast Cancer Maternal Grandmother     Prostate Cancer Maternal Grandfather     Other Cancer Maternal Grandfather         Lung    Alzheimer Disease Paternal Grandmother         early onset       Social History:  Social History     Tobacco Use    Smoking status: Never    Smokeless tobacco: Never   Vaping Use    Vaping Use: Never used   Substance Use Topics    Alcohol use: Yes     Comment: 16 per week.    Drug use: Never       ROS:  A 10-point review of systems was performed and is negative except as per HPI.    O:  /84 (BP Location: Right arm, Cuff Size: Adult Regular)   Wt 76.2 kg (168 lb 1.6 oz)   BMI 26.33 kg/m      Exam:  GEN: Appears well, NAD  CV: Well perfused  PULM: NWOB  ABD: Soft, non-tender, non-distended  : Normal external genitalia. Vagina normal. Cervix normal. Bimanual with no CMT, small anteverted uterus, no adnexal masses. Normal discharge, no lesions, no bleeding. Exam chaperoned by Vivi Simons MA  SKIN: Appears normal without rashes or lesions.  EXT: Warm, well perfused, no edema    Procedure: Nexplanon Removal  The Nexplanon was palpated in the patient's left arm. The skin overlying the distal end of the Nexplanon was indented with a pen cap. The skin was cleansed with alcohol. The skin was then cleansed x3 with betadine. The subcutaneous tissue at the distal end of the Nexplanon was infiltrated with 1% Lidocaine plain. A vertical incision was made with a scalpel in the skin overlying the end of the Nexplanon in the site of the insertion scar. Lucinda clamps were used dissect the subcutaneous tissue. The Nexplanon was grasped and removed intact. The incision was covered with a pressure dressing and  wrapped with Coban. EBL <2 mL. Vivi Simons MA was present for the removal.    Procedure: Endometrial Biopsy and Mirena IUD Insertion  The speculum was placed and cervix visualized. Cervix cleansed with betadine x3. Tenaculum placed on anterior lip of cervix. Pipelle was inserted and sounded to 7.5 cm. Pipelle was rotated and moderate amount of tissue removed. This was repeated x1. The Mirena IUD was then inserted according to the 's insertion device. The strings were trimmed 2 cm below the cervix. The tenaculum was removed. Excellent hemostasis with pressure and silver nitrate. Patient tolerated procedure well. EBL <5 ml. Procedure chaperoned by Vivi Simons MA    A/P:  Анна Alexander is a 38 year old  who presents for AUB, and for Nexplanon removal and Mirena IUD insertion.    #AUB  - AUB characterized by menorrhagia pre-Nexplanon insertion and menorrhagia and irregular bleeding with Nexplanon in place  - Differential includes pregnancy, structural abnormality (polyp, fibroid, adenomyosis), hyperplasia/malignancy, endometrial abnormality (including atrophy), infection, ovulatory dysfunction (perimenopausal, PCOS, thyroid disorder, prolactinoma), vascular abnormality, coagulopathy, iatrogenic, medication induced (including related to OCP use). Discussed importance of also ruling out hyperplasia and malignancy.  - Bleeding likely multimodal with leading etiologies including physiologic, anatomical, Nexplanon related.  - Has not had prior workup for AUB.  - Labs: Hgb, TSH, A1c, Prolactin  - Infection eval: GC/CT  - Pap test: up to date  - Endometrial biopsy: Performed  - Pelvic US: Ordered  - Nexplanon removed today  - Mirena IUD inserted today.  - Discussed cares after Nexplanon removal and expectations after Mirena placement    - Follow-up after pelvic US    Deyvi Simms MD MPH  [facility]  2024 2:23 PM

## 2024-03-25 NOTE — NURSING NOTE
"Chief Complaint   Patient presents with    Contraception     Remove Nexplanon   placed 2021   Insert IUD   Pap & G/C  2024        Initial /84 (BP Location: Right arm, Cuff Size: Adult Regular)   Wt 76.2 kg (168 lb 1.6 oz)   BMI 26.33 kg/m   Estimated body mass index is 26.33 kg/m  as calculated from the following:    Height as of 24: 1.702 m (5' 7\").    Weight as of this encounter: 76.2 kg (168 lb 1.6 oz).  BP completed using cuff size: regular    Questioned patient about current smoking habits.  Pt. has never smoked.          The following HM Due: NONE      Vivi Simons, TRISTAN on 3/25/2024 at 2:20 PM         "

## 2024-03-28 ENCOUNTER — TELEPHONE (OUTPATIENT)
Dept: OBGYN | Facility: CLINIC | Age: 39
End: 2024-03-28
Payer: COMMERCIAL

## 2024-03-28 DIAGNOSIS — N84.0 ENDOMETRIAL POLYP: ICD-10-CM

## 2024-03-28 DIAGNOSIS — N93.9 ABNORMAL UTERINE BLEEDING (AUB): Primary | ICD-10-CM

## 2024-03-28 NOTE — TELEPHONE ENCOUNTER
Telephone Call  03/28/2024    Called Анна PARKER Alexander to discuss results of endometrial biopsy showed endometrial polyp.    - Recommend hysteroscopy D&C with polypectomy and removal and reinsertion of Mirena IUD  - Unfortunately just had Mirena placed so we will need to remove and replace this as well  - She is scheduled for a pelvic US and will complete this before the procedure  - She is in agreement with this plan  - Discussed she will need pre-op visit before procedure.  - Orders placed    Final Diagnosis   A.  Endometrium, biopsy:  -Fragments of inactive endometrium and endometrial polyp with ciliated metaplasia; negative for hyperplasia, atypia and malignancy.  -Fragments of unremarkable endocervical glands.     - We also discussed elevated prolactin lab and recommendation that she have this re-drawn when fasting.    Gt Simms MD

## 2024-03-29 ENCOUNTER — TELEPHONE (OUTPATIENT)
Dept: OBGYN | Facility: CLINIC | Age: 39
End: 2024-03-29
Payer: COMMERCIAL

## 2024-03-29 NOTE — TELEPHONE ENCOUNTER
Patient Name: Анна Alexander   MRN: [id number]   Case#: [id number]   Surgeon(s) and Role:      * Gt Simms MD - Primary   Date requested: * No dates entered *   Location: [facility] OR   Procedure(s):   HYSTEROSCOPY, WITH DILATION AND CURETTAGE OF UTERUS and polypectomy with myosure Removal and reinsertion of Mirena IUD

## 2024-04-10 NOTE — TELEPHONE ENCOUNTER
Type of surgery: hysteroscopy with dilation and curettage and polypectomy with myosure removal and reinsertion of mirena iud  Location of surgery: [facility]  Date and time of surgery: 4/30/24 @ 8:05 am  Surgeon: Dr. Simms  Pre-Op Appt Date: Patient advised to schedule.  Post-Op Appt Date: 5/13/24   Packet sent out: Yes  Pre-cert/Authorization completed:  No  Date: 4/10/24

## 2024-04-16 ENCOUNTER — ANCILLARY PROCEDURE (OUTPATIENT)
Dept: ULTRASOUND IMAGING | Facility: CLINIC | Age: 39
End: 2024-04-16
Attending: STUDENT IN AN ORGANIZED HEALTH CARE EDUCATION/TRAINING PROGRAM
Payer: COMMERCIAL

## 2024-04-16 ENCOUNTER — LAB (OUTPATIENT)
Dept: LAB | Facility: CLINIC | Age: 39
End: 2024-04-16
Payer: COMMERCIAL

## 2024-04-16 DIAGNOSIS — R79.89 ELEVATED PROLACTIN LEVEL: ICD-10-CM

## 2024-04-16 DIAGNOSIS — N93.9 ABNORMAL UTERINE BLEEDING (AUB): ICD-10-CM

## 2024-04-16 PROCEDURE — 76830 TRANSVAGINAL US NON-OB: CPT | Performed by: OBSTETRICS & GYNECOLOGY

## 2024-04-16 PROCEDURE — 76856 US EXAM PELVIC COMPLETE: CPT | Performed by: OBSTETRICS & GYNECOLOGY

## 2024-04-16 PROCEDURE — 84146 ASSAY OF PROLACTIN: CPT

## 2024-04-16 PROCEDURE — 36415 COLL VENOUS BLD VENIPUNCTURE: CPT

## 2024-04-18 LAB — PROLACTIN SERPL 3RD IS-MCNC: 11 NG/ML (ref 5–23)

## 2024-04-25 ENCOUNTER — MYC MEDICAL ADVICE (OUTPATIENT)
Dept: PEDIATRICS | Facility: CLINIC | Age: 39
End: 2024-04-25
Payer: COMMERCIAL

## 2024-04-25 DIAGNOSIS — Z91.89 AT HIGH RISK FOR BREAST CANCER: Primary | ICD-10-CM

## 2024-04-26 ENCOUNTER — E-VISIT (OUTPATIENT)
Dept: URGENT CARE | Facility: CLINIC | Age: 39
End: 2024-04-26
Payer: COMMERCIAL

## 2024-04-26 DIAGNOSIS — H10.33 ACUTE BACTERIAL CONJUNCTIVITIS OF BOTH EYES: Primary | ICD-10-CM

## 2024-04-26 PROCEDURE — 99207 PR NON-BILLABLE SERV PER CHARTING: CPT | Performed by: PHYSICIAN ASSISTANT

## 2024-04-26 RX ORDER — POLYMYXIN B SULFATE AND TRIMETHOPRIM 1; 10000 MG/ML; [USP'U]/ML
SOLUTION OPHTHALMIC
Qty: 10 ML | Refills: 0 | Status: SHIPPED | OUTPATIENT
Start: 2024-04-26 | End: 2024-05-03

## 2024-04-26 NOTE — PATIENT INSTRUCTIONS
Thank you for choosing us for your care. I have placed an order for a prescription so that you can start treatment. View your full visit summary for details by clicking on the link below. Your pharmacist will able to address any questions you may have about the medication.     If you re not feeling better within 2-3 days, please schedule an appointment.  You can schedule an appointment right here in [facility], or call [phone]  If the visit is for the same symptoms as your eVisit, we ll refund the cost of your eVisit if seen within seven days.    Pinkeye: Care Instructions  Overview     Pinkeye is redness and swelling of the eye surface and the conjunctiva (the lining of the eyelid and the covering of the white part of the eye). Pinkeye is also called conjunctivitis. Pinkeye is often caused by infection with bacteria or a virus. Dry air, allergies, smoke, and chemicals are other common causes.  Pinkeye often gets better on its own in 7 to 10 days. Antibiotics only help if the pinkeye is caused by bacteria. Pinkeye caused by infection spreads easily. If an allergy or chemical is causing pinkeye, it will not go away unless you can avoid whatever is causing it.  Follow-up care is a key part of your treatment and safety. Be sure to make and go to all appointments, and call your doctor if you are having problems. It's also a good idea to know your test results and keep a list of the medicines you take.  How can you care for yourself at home?  Wash your hands often. Always wash them before and after you treat pinkeye or touch your eyes or face.  Use moist cotton or a clean, wet cloth to remove crust. Wipe from the inside corner of the eye to the outside. Use a clean part of the cloth for each wipe.  Put cold or warm wet cloths on your eye a few times a day if the eye hurts.  Do not wear contact lenses or eye makeup until the pinkeye is gone. Throw away any eye makeup you were using when you got pinkeye. Clean your  "contacts and storage case. If you wear disposable contacts, use a new pair when your eye has cleared and it is safe to wear contacts again.  If the doctor gave you antibiotic ointment or eyedrops, use them as directed. Use the medicine for as long as instructed, even if your eye starts looking better soon. Keep the bottle tip clean, and do not let it touch the eye area.  To put in eyedrops or ointment:  Tilt your head back, and pull your lower eyelid down with one finger.  Drop or squirt the medicine inside the lower lid.  Close your eye for 30 to 60 seconds to let the drops or ointment move around.  Do not touch the ointment or dropper tip to your eyelashes or any other surface.  Do not share towels, pillows, or washcloths while you have pinkeye.  When should you call for help?   Call your doctor now or seek immediate medical care if:    You have pain in your eye, not just irritation on the surface.     You have a change in vision or loss of vision.     You have an increase in discharge from the eye.     Your eye has not started to improve or begins to get worse within 48 hours after you start using antibiotics.     Pinkeye lasts longer than 7 days.   Watch closely for changes in your health, and be sure to contact your doctor if you have any problems.  Where can you learn more?  Go to https://www.asap54.com.net/patiented  Enter Y392 in the search box to learn more about \"Pinkeye: Care Instructions.\"  Current as of: June 5, 2023               Content Version: 14.0    5314-1007 [employer].   Care instructions adapted under license by your healthcare professional. If you have questions about a medical condition or this instruction, always ask your healthcare professional. [employer] disclaims any warranty or liability for your use of this information.       Taking Care of Pinkeye at Home (01:30)  Your health professional recommends that you watch this short online health video.  Learn ways " to ease the discomfort of pinkeye and keep the infection from spreading.  Purpose:  Describes basic home care for pinkeye to ease discomfort and prevent the spread of the infection.  Goal:  User will learn home treatment for pinkeye.     How to watch the video    Scan the QR code   OR Visit the website    https://hwi.se/r/Llpxoj59gtxjx   Current as of: June 5, 2023               Content Version: 14.0    2134-6203 [email or web address].   Care instructions adapted under license by your healthcare professional. If you have questions about a medical condition or this instruction, always ask your healthcare professional. [email or web address] disclaims any warranty or liability for your use of this information.

## 2024-05-01 ENCOUNTER — OFFICE VISIT (OUTPATIENT)
Dept: FAMILY MEDICINE | Facility: CLINIC | Age: 39
End: 2024-05-01
Payer: COMMERCIAL

## 2024-05-01 VITALS
OXYGEN SATURATION: 98 % | BODY MASS INDEX: 26.84 KG/M2 | HEIGHT: 67 IN | SYSTOLIC BLOOD PRESSURE: 132 MMHG | WEIGHT: 171 LBS | HEART RATE: 80 BPM | DIASTOLIC BLOOD PRESSURE: 83 MMHG | TEMPERATURE: 97.7 F | RESPIRATION RATE: 18 BRPM

## 2024-05-01 DIAGNOSIS — Z01.818 PREOP GENERAL PHYSICAL EXAM: Primary | ICD-10-CM

## 2024-05-01 DIAGNOSIS — N84.0 ENDOMETRIAL POLYP: ICD-10-CM

## 2024-05-01 LAB — HCG UR QL: NEGATIVE

## 2024-05-01 PROCEDURE — 81025 URINE PREGNANCY TEST: CPT

## 2024-05-01 PROCEDURE — 99213 OFFICE O/P EST LOW 20 MIN: CPT

## 2024-05-01 NOTE — PROGRESS NOTES
Preoperative Evaluation  [facility]  [address] [place] [address]  Phone: [phone]  Fax: [phone]  Primary Provider: Shabnam Bryant  Pre-op Performing Provider: CASTRO ROUSSEAU  May 1, 2024     Анна is a 38 year old, presenting for the following:  Recheck Medication and Pre-Op Exam (Pt is here for preop)        5/1/2024     1:16 PM   Additional Questions   Roomed by aly         5/1/2024     1:16 PM   Patient Reported Additional Medications   Patient reports taking the following new medications no     Surgical Information  Surgery/Procedure: HYSTEROSCOPY  Surgery Location: [facility]   Surgeon:   Surgery Date: 5/08  Time of Surgery: TBD  Where patient plans to recover: At home with family  Fax number for surgical facility: Note does not need to be faxed, will be available electronically in Epic.    Assessment & Plan     The proposed surgical procedure is considered LOW risk.    Preop general physical exam  Preoperative exam completed today. No chronic disease or medications. History of post-partum hemorrhage with blood transfusion. Discussed herbal/supplement hold times. Cardiac risk very low. Recent labs reviewed.   - Urine HCG    Endometrial polyp  Associated AUB, procedure planned for 5/8.          - No identified additional risk factors other than previously addressed    Antiplatelet or Anticoagulation Medication Instructions   - Patient is on no antiplatelet or anticoagulation medications.    Additional Medication Instructions   - Herbal medications and vitamins: HOLD 14 days prior to surgery.    Recommendation  APPROVAL GIVEN to proceed with proposed procedure, without further diagnostic evaluation.        Subjective       HPI related to upcoming procedure:     History of abnormal uterine bleeding. Nexplanon placed and continued to have abnormal bleeding. Was Heavy bleeding with the nexplanon.    Mirena placed and  endometrial biopsy completed 3/25.          4/30/2024     9:18 AM   Preop Questions   1. Have you ever had a heart attack or stroke? No   2. Have you ever had surgery on your heart or blood vessels, such as a stent placement, a coronary artery bypass, or surgery on an artery in your head, neck, heart, or legs? No   3. Do you have chest pain with activity? No   4. Do you have a history of  heart failure? No   5. Do you currently have a cold, bronchitis or symptoms of other infection? No   6. Do you have a cough, shortness of breath, or wheezing? No   7. Do you or anyone in your family have previous history of blood clots? YES - YES - Mom has had blood clots   8. Do you or does anyone in your family have a serious bleeding problem such as prolonged bleeding following surgeries or cuts? No   9. Have you ever had problems with anemia or been told to take iron pills? YES - YES - post-partum hemorrhage   10. Have you had any abnormal blood loss such as black, tarry or bloody stools, or abnormal vaginal bleeding? YES - YES - post-partum hemorrhage   11. Have you ever had a blood transfusion? YES - YES Post-partum   11a. Have you ever had a transfusion reaction? No   12. Are you willing to have a blood transfusion if it is medically needed before, during, or after your surgery? Yes   13. Have you or any of your relatives ever had problems with anesthesia? UNKNOWN - UNKNOWN   14. Do you have sleep apnea, excessive snoring or daytime drowsiness? No   15. Do you have any artifical heart valves or other implanted medical devices like a pacemaker, defibrillator, or continuous glucose monitor? No   16. Do you have artificial joints? No   17. Are you allergic to latex? No   18. Is there any chance that you may be pregnant? No       Health Care Directive  Patient does not have a Health Care Directive or Living Will: Discussed advance care planning with patient; however, patient declined at this time.    Preoperative Review of     reviewed - no record of controlled substances prescribed.      Patient Active Problem List    Diagnosis Date Noted    BMI 26.0-26.9,adult 02/21/2023     Priority: Medium    Family history of malignant neoplasm of breast 02/21/2023     Priority: Medium    Hair loss 11/01/2022     Priority: Medium    Cervical high risk HPV (human papillomavirus) test positive 02/11/2021     Priority: Medium     02/11/21 NIL Pap, + HR HPV (not 16 or 18) Plan cotest in 1 year due 02/11/22.  6/17/22 Lost to follow-up for pap tracking  2/21/23 NIL Pap, Neg HPV. Plan cotest in 1 year.   2/22/24 NIL Pap, Neg HPV. Plan cotest in 3 years.                        Past Medical History:   Diagnosis Date    Cervical high risk HPV (human papillomavirus) test positive 02/11/2021 02/11/21    History of blood transfusion July 2014    Blood loss from child birth     Past Surgical History:   Procedure Laterality Date    COSMETIC SURGERY  1993    Ears were pinned back     Current Outpatient Medications   Medication Sig Dispense Refill    levonorgestrel (MIRENA) 52 MG (20 mcg/day) IUD 1 each by Intrauterine route once      polymixin b-trimethoprim (POLYTRIM) 42514-9.1 UNIT/ML-% ophthalmic solution 1 drop in affected eye(s) every 3 hrs while awake for 5-7 days until resolved - max 6 doses per day 10 mL 0       Allergies   Allergen Reactions    Cetirizine         Social History     Tobacco Use    Smoking status: Never    Smokeless tobacco: Never   Substance Use Topics    Alcohol use: Yes     Comment: 16 per week.     Family History   Problem Relation Age of Onset    Breast Cancer Mother 55        55    Cardiovascular Mother         bicuspid aortic valve    Osteoporosis Mother     Hypertension Father     Memory loss Father     No Known Problems Sister     No Known Problems Brother     No Known Problems Brother     No Known Problems Brother     Breast Cancer Maternal Grandmother     Prostate Cancer Maternal Grandfather     Other Cancer Maternal  "Grandfather         Lung    Alzheimer Disease Paternal Grandmother         early onset     History   Drug Use Unknown         Review of Systems    Review of Systems  CONSTITUTIONAL: NEGATIVE for fever, chills, change in weight  INTEGUMENTARY/SKIN: NEGATIVE for worrisome rashes, moles or lesions  EYES: conjunctivitis  ENT/MOUTH: NEGATIVE for ear, mouth and throat problems  RESP: NEGATIVE for significant cough or SOB  BREAST: NEGATIVE for masses, tenderness or discharge  CV: NEGATIVE for chest pain, palpitations or peripheral edema  GI: NEGATIVE for nausea, abdominal pain, heartburn, or change in bowel habits  : NEGATIVE for frequency, dysuria, or hematuria  MUSCULOSKELETAL: NEGATIVE for significant arthralgias or myalgia  NEURO: NEGATIVE for weakness, dizziness or paresthesias  ENDOCRINE: NEGATIVE for temperature intolerance, skin/hair changes  HEME: NEGATIVE for bleeding problems  PSYCHIATRIC: NEGATIVE for changes in mood or affect    Objective    /83 (BP Location: Left arm, Patient Position: Sitting, Cuff Size: Adult Regular)   Pulse 80   Temp 97.7  F (36.5  C) (Tympanic)   Resp 18   Ht 1.702 m (5' 7\")   Wt 77.6 kg (171 lb)   LMP  (LMP Unknown)   SpO2 98%   BMI 26.78 kg/m     Estimated body mass index is 26.78 kg/m  as calculated from the following:    Height as of this encounter: 1.702 m (5' 7\").    Weight as of this encounter: 77.6 kg (171 lb).    Physical Exam  GENERAL: alert and no distress  EYES: Eyes grossly normal to inspection, PERRL and conjunctivae and sclerae normal  HENT: ear canals and TM's normal, nose and mouth without ulcers or lesions  NECK: no adenopathy, no asymmetry, masses, or scars  RESP: lungs clear to auscultation - no rales, rhonchi or wheezes  CV: regular rate and rhythm, normal S1 S2, no S3 or S4, no murmur, click or rub, no peripheral edema  ABDOMEN: soft, nontender, no hepatosplenomegaly, no masses and bowel sounds normal  MS: no gross musculoskeletal defects noted, no " edema  SKIN: no suspicious lesions or rashes  NEURO: Normal strength and tone, mentation intact and speech normal  PSYCH: mentation appears normal, affect normal/bright    Recent Labs   Lab Test 03/25/24  1535 02/21/23  1507 02/21/23  1502   HGB 12.9  --  13.9   PLT  --   --  196   A1C 5.0 5.1  --         Diagnostics  Labs pending at this time.  Results will be reviewed when available.   No EKG required, no history of coronary heart disease, significant arrhythmia, peripheral arterial disease or other structural heart disease.    Revised Cardiac Risk Index (RCRI)  The patient has the following serious cardiovascular risks for perioperative complications:   - No serious cardiac risks = 0 points     RCRI Interpretation: 0 points: Class I (very low risk - 0.4% complication rate)       Signed Electronically by: MARCK Plata CNP  Copy of this evaluation report is provided to requesting physician.

## 2024-05-01 NOTE — PATIENT INSTRUCTIONS
Preparing for Your Surgery  Getting started  A nurse will call you to review your health history and instructions. They will give you an arrival time based on your scheduled surgery time. Please be ready to share:  Your doctor's clinic name and phone number  Your medical, surgical, and anesthesia history  A list of allergies and sensitivities  A list of medicines, including herbal treatments and over-the-counter drugs  Whether the patient has a legal guardian (ask how to send us the papers in advance)  Please tell us if you're pregnant--or if there's any chance you might be pregnant. Some surgeries may injure a fetus (unborn baby), so they require a pregnancy test. Surgeries that are safe for a fetus don't always need a test, and you can choose whether to have one.   If you have a child who's having surgery, please ask for a copy of Preparing for Your Child's Surgery.    Preparing for surgery  Within 10 to 30 days of surgery: Have a pre-op exam (sometimes called an H&P, or History and Physical). This can be done at a clinic or pre-operative center.  If you're having a , you may not need this exam. Talk to your care team.  At your pre-op exam, talk to your care team about all medicines you take. If you need to stop any medicines before surgery, ask when to start taking them again.  We do this for your safety. Many medicines can make you bleed too much during surgery. Some change how well surgery (anesthesia) drugs work.  Call your insurance company to let them know you're having surgery. (If you don't have insurance, call [phone].)  Call your clinic if there's any change in your health. This includes signs of a cold or flu (sore throat, runny nose, cough, rash, fever). It also includes a scrape or scratch near the surgery site.  If you have questions on the day of surgery, call your hospital or surgery center.  Eating and drinking guidelines  For your safety: Unless your surgeon tells you otherwise,  follow the guidelines below.  Eat and drink as usual until 8 hours before you arrive for surgery. After that, no food or milk.  Drink clear liquids until 2 hours before you arrive. These are liquids you can see through, like water, Gatorade, and Propel Water. They also include plain black coffee and tea (no cream or milk), candy, and breath mints. You can spit out gum when you arrive.  If you drink alcohol: Stop drinking it the night before surgery.  If your care team tells you to take medicine on the morning of surgery, it's okay to take it with a sip of water.  Preventing infection  Shower or bathe the night before and morning of your surgery. Follow the instructions your clinic gave you. (If no instructions, use regular soap.)  Don't shave or clip hair near your surgery site. We'll remove the hair if needed.  Don't smoke or vape the morning of surgery. You may chew nicotine gum up to 2 hours before surgery. A nicotine patch is okay.  Note: Some surgeries require you to completely quit smoking and nicotine. Check with your surgeon.  Your care team will make every effort to keep you safe from infection. We will:  Clean our hands often with soap and water (or an alcohol-based hand rub).  Clean the skin at your surgery site with a special soap that kills germs.  Give you a special gown to keep you warm. (Cold raises the risk of infection.)  Wear special hair covers, masks, gowns and gloves during surgery.  Give antibiotic medicine, if prescribed. Not all surgeries need antibiotics.  What to bring on the day of surgery  Photo ID and insurance card  Copy of your health care directive, if you have one  Glasses and hearing aids (bring cases)  You can't wear contacts during surgery  Inhaler and eye drops, if you use them (tell us about these when you arrive)  CPAP machine or breathing device, if you use them  A few personal items, if spending the night  If you have . . .  A pacemaker, ICD (cardiac defibrillator) or other  implant: Bring the ID card.  An implanted stimulator: Bring the remote control.  A legal guardian: Bring a copy of the certified (court-stamped) guardianship papers.  Please remove any jewelry, including body piercings. Leave jewelry and other valuables at home.  If you're going home the day of surgery  You must have a responsible adult drive you home. They should stay with you overnight as well.  If you don't have someone to stay with you, and you aren't safe to go home alone, we may keep you overnight. Insurance often won't pay for this.  After surgery  If it's hard to control your pain or you need more pain medicine, please call your surgeon's office.  Questions?   If you have any questions for your care team, list them here: _________________________________________________________________________________________________________________________________________________________________________ ____________________________________ ____________________________________ ____________________________________  For informational purposes only. Not to replace the advice of your health care provider. Copyright   2003, 2019 [employer]. All rights reserved. Clinically reviewed by Kimberly Stacy MD. [employer] 045324 - REV 12/22.    How to Take Your Medication Before Surgery  - STOP taking all vitamins and herbal supplements 14 days before surgery.

## 2024-05-02 ENCOUNTER — ANCILLARY PROCEDURE (OUTPATIENT)
Dept: MAMMOGRAPHY | Facility: CLINIC | Age: 39
End: 2024-05-02
Attending: PHYSICIAN ASSISTANT
Payer: COMMERCIAL

## 2024-05-02 DIAGNOSIS — Z80.3 FAMILY HISTORY OF MALIGNANT NEOPLASM OF BREAST: ICD-10-CM

## 2024-05-02 DIAGNOSIS — Z12.31 VISIT FOR SCREENING MAMMOGRAM: ICD-10-CM

## 2024-05-02 PROCEDURE — 77067 SCR MAMMO BI INCL CAD: CPT | Mod: TC | Performed by: RADIOLOGY

## 2024-05-02 PROCEDURE — 77063 BREAST TOMOSYNTHESIS BI: CPT | Mod: TC | Performed by: RADIOLOGY

## 2024-05-08 ENCOUNTER — ANESTHESIA EVENT (OUTPATIENT)
Dept: SURGERY | Facility: CLINIC | Age: 39
End: 2024-05-08
Payer: COMMERCIAL

## 2024-05-08 ENCOUNTER — ANESTHESIA (OUTPATIENT)
Dept: SURGERY | Facility: CLINIC | Age: 39
End: 2024-05-08
Payer: COMMERCIAL

## 2024-05-08 ENCOUNTER — HOSPITAL ENCOUNTER (OUTPATIENT)
Facility: CLINIC | Age: 39
Discharge: HOME | End: 2024-05-08
Attending: STUDENT IN AN ORGANIZED HEALTH CARE EDUCATION/TRAINING PROGRAM | Admitting: STUDENT IN AN ORGANIZED HEALTH CARE EDUCATION/TRAINING PROGRAM
Payer: COMMERCIAL

## 2024-05-08 VITALS
HEART RATE: 64 BPM | SYSTOLIC BLOOD PRESSURE: 116 MMHG | OXYGEN SATURATION: 98 % | WEIGHT: 169.5 LBS | DIASTOLIC BLOOD PRESSURE: 80 MMHG | BODY MASS INDEX: 26.55 KG/M2 | TEMPERATURE: 96.8 F | RESPIRATION RATE: 18 BRPM

## 2024-05-08 DIAGNOSIS — N83.202 CYST OF LEFT OVARY: Primary | ICD-10-CM

## 2024-05-08 LAB
HCG UR QL: NEGATIVE
HGB BLD-MCNC: 13.3 G/DL (ref 11.7–15.7)

## 2024-05-08 PROCEDURE — 58301 REMOVE INTRAUTERINE DEVICE: CPT | Mod: 51 | Performed by: STUDENT IN AN ORGANIZED HEALTH CARE EDUCATION/TRAINING PROGRAM

## 2024-05-08 PROCEDURE — 710N000009 HC RECOVERY PHASE 1, LEVEL 1, PER MIN: Performed by: STUDENT IN AN ORGANIZED HEALTH CARE EDUCATION/TRAINING PROGRAM

## 2024-05-08 PROCEDURE — 258N000003 HC RX IP 258 OP 636: Performed by: ANESTHESIOLOGY

## 2024-05-08 PROCEDURE — 85018 HEMOGLOBIN: CPT | Performed by: STUDENT IN AN ORGANIZED HEALTH CARE EDUCATION/TRAINING PROGRAM

## 2024-05-08 PROCEDURE — 360N000076 HC SURGERY LEVEL 3, PER MIN: Performed by: STUDENT IN AN ORGANIZED HEALTH CARE EDUCATION/TRAINING PROGRAM

## 2024-05-08 PROCEDURE — 36415 COLL VENOUS BLD VENIPUNCTURE: CPT | Performed by: STUDENT IN AN ORGANIZED HEALTH CARE EDUCATION/TRAINING PROGRAM

## 2024-05-08 PROCEDURE — 999N000141 HC STATISTIC PRE-PROCEDURE NURSING ASSESSMENT: Performed by: STUDENT IN AN ORGANIZED HEALTH CARE EDUCATION/TRAINING PROGRAM

## 2024-05-08 PROCEDURE — 250N000009 HC RX 250: Performed by: STUDENT IN AN ORGANIZED HEALTH CARE EDUCATION/TRAINING PROGRAM

## 2024-05-08 PROCEDURE — 250N000009 HC RX 250: Performed by: NURSE ANESTHETIST, CERTIFIED REGISTERED

## 2024-05-08 PROCEDURE — 88305 TISSUE EXAM BY PATHOLOGIST: CPT | Mod: 26 | Performed by: PATHOLOGY

## 2024-05-08 PROCEDURE — 272N000001 HC OR GENERAL SUPPLY STERILE: Performed by: STUDENT IN AN ORGANIZED HEALTH CARE EDUCATION/TRAINING PROGRAM

## 2024-05-08 PROCEDURE — 710N000012 HC RECOVERY PHASE 2, PER MINUTE: Performed by: STUDENT IN AN ORGANIZED HEALTH CARE EDUCATION/TRAINING PROGRAM

## 2024-05-08 PROCEDURE — 88305 TISSUE EXAM BY PATHOLOGIST: CPT | Mod: TC | Performed by: STUDENT IN AN ORGANIZED HEALTH CARE EDUCATION/TRAINING PROGRAM

## 2024-05-08 PROCEDURE — 58558 HYSTEROSCOPY BIOPSY: CPT | Performed by: STUDENT IN AN ORGANIZED HEALTH CARE EDUCATION/TRAINING PROGRAM

## 2024-05-08 PROCEDURE — 250N000025 HC SEVOFLURANE, PER MIN: Performed by: STUDENT IN AN ORGANIZED HEALTH CARE EDUCATION/TRAINING PROGRAM

## 2024-05-08 PROCEDURE — 81025 URINE PREGNANCY TEST: CPT | Performed by: STUDENT IN AN ORGANIZED HEALTH CARE EDUCATION/TRAINING PROGRAM

## 2024-05-08 PROCEDURE — 370N000017 HC ANESTHESIA TECHNICAL FEE, PER MIN: Performed by: STUDENT IN AN ORGANIZED HEALTH CARE EDUCATION/TRAINING PROGRAM

## 2024-05-08 PROCEDURE — 250N000011 HC RX IP 250 OP 636: Performed by: STUDENT IN AN ORGANIZED HEALTH CARE EDUCATION/TRAINING PROGRAM

## 2024-05-08 PROCEDURE — 58300 INSERT INTRAUTERINE DEVICE: CPT | Performed by: STUDENT IN AN ORGANIZED HEALTH CARE EDUCATION/TRAINING PROGRAM

## 2024-05-08 PROCEDURE — 250N000011 HC RX IP 250 OP 636: Performed by: NURSE ANESTHETIST, CERTIFIED REGISTERED

## 2024-05-08 PROCEDURE — 250N000013 HC RX MED GY IP 250 OP 250 PS 637: Performed by: STUDENT IN AN ORGANIZED HEALTH CARE EDUCATION/TRAINING PROGRAM

## 2024-05-08 PROCEDURE — 258N000001 HC RX 258: Performed by: STUDENT IN AN ORGANIZED HEALTH CARE EDUCATION/TRAINING PROGRAM

## 2024-05-08 PROCEDURE — 258N000003 HC RX IP 258 OP 636: Performed by: NURSE ANESTHETIST, CERTIFIED REGISTERED

## 2024-05-08 DEVICE — IUD CONTRACEPTIVE DEVICE MIRENA 50419-4230-01: Type: IMPLANTABLE DEVICE | Site: UTERUS | Status: FUNCTIONAL

## 2024-05-08 RX ORDER — ACETAMINOPHEN 325 MG/1
975 TABLET ORAL ONCE
Status: COMPLETED | OUTPATIENT
Start: 2024-05-08 | End: 2024-05-08

## 2024-05-08 RX ORDER — ONDANSETRON 4 MG/1
4 TABLET, ORALLY DISINTEGRATING ORAL EVERY 30 MIN PRN
Status: DISCONTINUED | OUTPATIENT
Start: 2024-05-08 | End: 2024-05-08 | Stop reason: HOSPADM

## 2024-05-08 RX ORDER — HYDROMORPHONE HCL IN WATER/PF 6 MG/30 ML
0.2 PATIENT CONTROLLED ANALGESIA SYRINGE INTRAVENOUS EVERY 5 MIN PRN
Status: DISCONTINUED | OUTPATIENT
Start: 2024-05-08 | End: 2024-05-08 | Stop reason: HOSPADM

## 2024-05-08 RX ORDER — NALOXONE HYDROCHLORIDE 0.4 MG/ML
0.1 INJECTION, SOLUTION INTRAMUSCULAR; INTRAVENOUS; SUBCUTANEOUS
Status: DISCONTINUED | OUTPATIENT
Start: 2024-05-08 | End: 2024-05-08 | Stop reason: HOSPADM

## 2024-05-08 RX ORDER — KETOROLAC TROMETHAMINE 30 MG/ML
INJECTION, SOLUTION INTRAMUSCULAR; INTRAVENOUS PRN
Status: DISCONTINUED | OUTPATIENT
Start: 2024-05-08 | End: 2024-05-08

## 2024-05-08 RX ORDER — DEXAMETHASONE SODIUM PHOSPHATE 4 MG/ML
INJECTION, SOLUTION INTRA-ARTICULAR; INTRALESIONAL; INTRAMUSCULAR; INTRAVENOUS; SOFT TISSUE PRN
Status: DISCONTINUED | OUTPATIENT
Start: 2024-05-08 | End: 2024-05-08

## 2024-05-08 RX ORDER — DEXAMETHASONE SODIUM PHOSPHATE 4 MG/ML
4 INJECTION, SOLUTION INTRA-ARTICULAR; INTRALESIONAL; INTRAMUSCULAR; INTRAVENOUS; SOFT TISSUE
Status: DISCONTINUED | OUTPATIENT
Start: 2024-05-08 | End: 2024-05-08 | Stop reason: HOSPADM

## 2024-05-08 RX ORDER — FENTANYL CITRATE 50 UG/ML
50 INJECTION, SOLUTION INTRAMUSCULAR; INTRAVENOUS EVERY 5 MIN PRN
Status: DISCONTINUED | OUTPATIENT
Start: 2024-05-08 | End: 2024-05-08 | Stop reason: HOSPADM

## 2024-05-08 RX ORDER — ONDANSETRON 2 MG/ML
4 INJECTION INTRAMUSCULAR; INTRAVENOUS EVERY 30 MIN PRN
Status: DISCONTINUED | OUTPATIENT
Start: 2024-05-08 | End: 2024-05-08 | Stop reason: HOSPADM

## 2024-05-08 RX ORDER — FENTANYL CITRATE 50 UG/ML
INJECTION, SOLUTION INTRAMUSCULAR; INTRAVENOUS PRN
Status: DISCONTINUED | OUTPATIENT
Start: 2024-05-08 | End: 2024-05-08

## 2024-05-08 RX ORDER — FENTANYL CITRATE 50 UG/ML
25 INJECTION, SOLUTION INTRAMUSCULAR; INTRAVENOUS EVERY 5 MIN PRN
Status: DISCONTINUED | OUTPATIENT
Start: 2024-05-08 | End: 2024-05-08 | Stop reason: HOSPADM

## 2024-05-08 RX ORDER — OXYCODONE HYDROCHLORIDE 5 MG/1
10 TABLET ORAL
Status: DISCONTINUED | OUTPATIENT
Start: 2024-05-08 | End: 2024-05-08 | Stop reason: HOSPADM

## 2024-05-08 RX ORDER — GLYCOPYRROLATE 0.2 MG/ML
INJECTION, SOLUTION INTRAMUSCULAR; INTRAVENOUS PRN
Status: DISCONTINUED | OUTPATIENT
Start: 2024-05-08 | End: 2024-05-08

## 2024-05-08 RX ORDER — SODIUM CHLORIDE, SODIUM LACTATE, POTASSIUM CHLORIDE, CALCIUM CHLORIDE 600; 310; 30; 20 MG/100ML; MG/100ML; MG/100ML; MG/100ML
INJECTION, SOLUTION INTRAVENOUS CONTINUOUS
Status: DISCONTINUED | OUTPATIENT
Start: 2024-05-08 | End: 2024-05-08 | Stop reason: HOSPADM

## 2024-05-08 RX ORDER — LIDOCAINE 40 MG/G
CREAM TOPICAL
Status: DISCONTINUED | OUTPATIENT
Start: 2024-05-08 | End: 2024-05-08 | Stop reason: HOSPADM

## 2024-05-08 RX ORDER — PROPOFOL 10 MG/ML
INJECTION, EMULSION INTRAVENOUS PRN
Status: DISCONTINUED | OUTPATIENT
Start: 2024-05-08 | End: 2024-05-08

## 2024-05-08 RX ORDER — OXYCODONE HYDROCHLORIDE 5 MG/1
5 TABLET ORAL
Status: DISCONTINUED | OUTPATIENT
Start: 2024-05-08 | End: 2024-05-08 | Stop reason: HOSPADM

## 2024-05-08 RX ORDER — PROPOFOL 10 MG/ML
INJECTION, EMULSION INTRAVENOUS CONTINUOUS PRN
Status: DISCONTINUED | OUTPATIENT
Start: 2024-05-08 | End: 2024-05-08

## 2024-05-08 RX ORDER — ACETAMINOPHEN 325 MG/1
975 TABLET ORAL ONCE
Status: DISCONTINUED | OUTPATIENT
Start: 2024-05-08 | End: 2024-05-08 | Stop reason: HOSPADM

## 2024-05-08 RX ORDER — LIDOCAINE HYDROCHLORIDE 20 MG/ML
INJECTION, SOLUTION INFILTRATION; PERINEURAL PRN
Status: DISCONTINUED | OUTPATIENT
Start: 2024-05-08 | End: 2024-05-08

## 2024-05-08 RX ORDER — IBUPROFEN 800 MG/1
800 TABLET, FILM COATED ORAL ONCE
Status: DISCONTINUED | OUTPATIENT
Start: 2024-05-08 | End: 2024-05-08 | Stop reason: HOSPADM

## 2024-05-08 RX ORDER — HYDROMORPHONE HCL IN WATER/PF 6 MG/30 ML
0.4 PATIENT CONTROLLED ANALGESIA SYRINGE INTRAVENOUS EVERY 5 MIN PRN
Status: DISCONTINUED | OUTPATIENT
Start: 2024-05-08 | End: 2024-05-08 | Stop reason: HOSPADM

## 2024-05-08 RX ORDER — ONDANSETRON 2 MG/ML
INJECTION INTRAMUSCULAR; INTRAVENOUS PRN
Status: DISCONTINUED | OUTPATIENT
Start: 2024-05-08 | End: 2024-05-08

## 2024-05-08 RX ADMIN — DEXAMETHASONE SODIUM PHOSPHATE 8 MG: 4 INJECTION, SOLUTION INTRA-ARTICULAR; INTRALESIONAL; INTRAMUSCULAR; INTRAVENOUS; SOFT TISSUE at 09:57

## 2024-05-08 RX ADMIN — KETOROLAC TROMETHAMINE 15 MG: 30 INJECTION, SOLUTION INTRAMUSCULAR at 10:29

## 2024-05-08 RX ADMIN — FENTANYL CITRATE 100 MCG: 50 INJECTION INTRAMUSCULAR; INTRAVENOUS at 09:57

## 2024-05-08 RX ADMIN — SODIUM CHLORIDE, POTASSIUM CHLORIDE, SODIUM LACTATE AND CALCIUM CHLORIDE: 600; 310; 30; 20 INJECTION, SOLUTION INTRAVENOUS at 09:54

## 2024-05-08 RX ADMIN — GLYCOPYRROLATE 0.2 MG: 0.2 INJECTION, SOLUTION INTRAMUSCULAR; INTRAVENOUS at 09:57

## 2024-05-08 RX ADMIN — PHENYLEPHRINE HYDROCHLORIDE 100 MCG: 10 INJECTION INTRAVENOUS at 10:20

## 2024-05-08 RX ADMIN — PROPOFOL 200 MG: 10 INJECTION, EMULSION INTRAVENOUS at 09:57

## 2024-05-08 RX ADMIN — LIDOCAINE HYDROCHLORIDE 30 MG: 20 INJECTION, SOLUTION INFILTRATION; PERINEURAL at 09:57

## 2024-05-08 RX ADMIN — PHENYLEPHRINE HYDROCHLORIDE 100 MCG: 10 INJECTION INTRAVENOUS at 10:09

## 2024-05-08 RX ADMIN — SODIUM CHLORIDE, POTASSIUM CHLORIDE, SODIUM LACTATE AND CALCIUM CHLORIDE: 600; 310; 30; 20 INJECTION, SOLUTION INTRAVENOUS at 09:07

## 2024-05-08 RX ADMIN — PROPOFOL 50 MCG/KG/MIN: 10 INJECTION, EMULSION INTRAVENOUS at 10:00

## 2024-05-08 RX ADMIN — MIDAZOLAM 2 MG: 1 INJECTION INTRAMUSCULAR; INTRAVENOUS at 09:54

## 2024-05-08 RX ADMIN — ONDANSETRON 4 MG: 2 INJECTION INTRAMUSCULAR; INTRAVENOUS at 10:29

## 2024-05-08 RX ADMIN — ACETAMINOPHEN 975 MG: 325 TABLET, FILM COATED ORAL at 09:02

## 2024-05-08 ASSESSMENT — ACTIVITIES OF DAILY LIVING (ADL)
ADLS_ACUITY_SCORE: 18

## 2024-05-08 NOTE — ANESTHESIA PROCEDURE NOTES
Airway       Patient location during procedure: OR       Procedure Start/Stop Times: 5/8/2024 9:59 AM  Staff -        CRNA: Javier Sanchez APRN CRNA       Performed By: CRNAIndications and Patient Condition       Indications for airway management: eva-procedural and airway protection       Induction type:intravenous       Mask difficulty assessment: 0 - not attempted    Final Airway Details       Final airway type: supraglottic airway    Supraglottic Airway Details        Type: LMA       Brand: I-Gel       LMA size: 4    Post intubation assessment        Placement verified by: capnometry, equal breath sounds and chest rise        Number of attempts at approach: 1       Secured with: commercial tube jain       Ease of procedure: easy       Dentition: Intact    Medication(s) Administered   Medication Administration Time: 5/8/2024 9:59 AM

## 2024-05-08 NOTE — DISCHARGE INSTRUCTIONS
You received Toradol, an IV form of Ibuprofen (Motrin) at 10:29 AM.  Do not take any Ibuprofen products until 06:30 PM.     Maximum acetaminophen (Tylenol) dose from all sources should not exceed 4 grams (4000 mg) per day.

## 2024-05-08 NOTE — OP NOTE
[facility]  Gynecology Operative Note     Date of Service: 2024  Surgeon:  Gt Simms MD    Preop Diagnosis:    - AUB  - Polyp on endometrial biopsy    Postop Diagnosis:    - Same s/p below stated procedure    Procedure:   HYSTEROSCOPY, WITH DILATION AND CURETTAGE OF UTERUS and polypectomy with myosure,  Removal and reinsertion of Mirena intrauterine device    Anesthesia:  GETA & Local  EBL:  5 mL  IVF:  900 mL crystalloid   UOP:  500 mL clear urine drained at end of case  Drains:  None  Fluid Deficit: 200 mL normal saline  Complications: None apparent    Specimens:    ID Type Source Tests Collected by Time Destination   1 : ENDOMETRIAL POLYPS AND CURETTINGS Polyp Endometrium SURGICAL PATHOLOGY EXAM Gt Simms MD 2024 10:25 AM      Indications:   Ms. Анна Alexander is a 38 year old , who presented to clinic with AUB. Endometrial biopsy showed and endometrial polyp.  Hysteroscopy with removal and reinsertion of Mirena IUD was recommended. The risks, benefits, and alternatives were discussed with the patient, and she agreed to proceed. Written informed consent was obtained prior to procedure including consent for a blood transfusion if indicated.    Findings:   Exam under anesthesia revealed normal external female genitalia. Normal appearing multiparous cervix. Cervix dilated to 5 mm. Hysteroscopic examination revealed diffuse polypoid tissue. Endometrial lining noted to be thin after. Both tubal ostia visualized. Uterus sounded to 8 cm.     Procedure Details:   The patient was brought to the operating room where adequate GETA was administered. SCDs in place. Preoperative antibiotics were not indicated. Patient was placed in dorsal lithotomy position with feet in yellow fin stirups. Exam under anesthesia revealed findings noted above. The patient was prepped and draped in the usual sterile fashion. The patient was not straight catheterized. A sterile  speculum was placed. The Mirena strings were grasped with a ring forceps and the Mirena was removed intact. The anterior lip of the cervix was injected with 1% Lidocaine plain and then grasped with a single tooth tenaculum. A paracervical block was placed with the same anesthetic injected at the 4 and 8 o'clock positions in the cervicovaginal junction. A total of 20 mL of 1% Lidocaine plain was used. The cervix sequentially passively dilated to 5 mm with hegar dilators. The cervix was easily dilated. The hysteroscope was placed in cervix with inflow turned on to achieve hydrodilation of cervix and the hysteroscope was advanced into endometrial cavity. The uterine cavity was explored with findings as noted above.  The outflow was removed and Myosure Reach morcellator was inserted through the hysteroscope. Endometrial polyps and tissue removed using Myosure with good results. Sharp curettage of the endometrium was not performed. The scope and was removed from the uterus. The uterus was sounded to 8 cm. The Mirena IUD was inserted according to the 's insertion device and the strings trimmed approximately 2 cm below the cervical os. The tenaculum was removed and tenaculum sites were noted to be hemostatic with pressure from a sponge stick and application of silver nitrate. All instruments were removed from the vagina. The bladder was drained.    The sponge, needle, and instrument counts were correct x2. The patient tolerated the procedure well and was transferred to recovery in stable condition.     Gt Simms MD

## 2024-05-08 NOTE — ANESTHESIA PREPROCEDURE EVALUATION
"Anesthesia Pre-Procedure Evaluation    Patient: Анна Alexander   MRN: [id number] : 1985        Procedure : Procedure(s):  HYSTEROSCOPY, WITH DILATION AND CURETTAGE OF UTERUS and polypectomy with myosure,  Removal and reinsertion of Mirena intrauterine device          Past Medical History:   Diagnosis Date    Cervical high risk HPV (human papillomavirus) test positive 2021    History of blood transfusion 2014    Blood loss from child birth      Past Surgical History:   Procedure Laterality Date    COSMETIC SURGERY      Ears were pinned back      Allergies   Allergen Reactions    Cetirizine       Social History     Tobacco Use    Smoking status: Never    Smokeless tobacco: Never   Substance Use Topics    Alcohol use: Yes     Comment: 16 per week.      Wt Readings from Last 1 Encounters:   24 76.9 kg (169 lb 8 oz)        Anesthesia Evaluation            ROS/MED HX  ENT/Pulmonary:  - neg pulmonary ROS     Neurologic:  - neg neurologic ROS     Cardiovascular:  - neg cardiovascular ROS     METS/Exercise Tolerance: >4 METS    Hematologic:  - neg hematologic  ROS     Musculoskeletal:  - neg musculoskeletal ROS     GI/Hepatic:  - neg GI/hepatic ROS     Renal/Genitourinary:  - neg Renal ROS     Endo: Comment: .Body mass index is 26.55 kg/m .        Psychiatric/Substance Use:  - neg psychiatric ROS     Infectious Disease:  - neg infectious disease ROS     Malignancy:  - neg malignancy ROS     Other:  - neg other ROS          Physical Exam    Airway        Mallampati: II    Neck ROM: full     Respiratory Devices and Support         Dental           Cardiovascular   cardiovascular exam normal       Rhythm and rate: regular     Pulmonary   pulmonary exam normal                OUTSIDE LABS:  CBC:   Lab Results   Component Value Date    WBC 6.9 2023    HGB 13.3 2024    HGB 12.9 2024    HCT 42.0 2023     2023     BMP: No results found for: \"NA\", " "\"POTASSIUM\", \"CHLORIDE\", \"CO2\", \"BUN\", \"CR\", \"GLC\"  COAGS: No results found for: \"PTT\", \"INR\", \"FIBR\"  POC:   Lab Results   Component Value Date    HCG Negative 05/01/2024     HEPATIC: No results found for: \"ALBUMIN\", \"PROTTOTAL\", \"ALT\", \"AST\", \"GGT\", \"ALKPHOS\", \"BILITOTAL\", \"BILIDIRECT\", \"MIREYA\"  OTHER:   Lab Results   Component Value Date    A1C 5.0 03/25/2024    TSH 2.04 03/25/2024       Anesthesia Plan    ASA Status:  1       Anesthesia Type: General.   Induction: Intravenous, Propofol.   Maintenance: Balanced.        Consents    Anesthesia Plan(s) and associated risks, benefits, and realistic alternatives discussed. Questions answered and patient/representative(s) expressed understanding.     - Discussed:     - Discussed with:  Patient            Postoperative Care    Pain management: IV analgesics, Oral pain medications, Multi-modal analgesia.   PONV prophylaxis: Ondansetron (or other 5HT-3), Dexamethasone or Solumedrol     Comments:               Fco Yanes, DO    I have reviewed the pertinent notes and labs in the chart from the past 30 days and (re)examined the patient.  Any updates or changes from those notes are reflected in this note.              # Overweight: Estimated body mass index is 26.55 kg/m  as calculated from the following:    Height as of 5/1/24: 1.702 m (5' 7\").    Weight as of this encounter: 76.9 kg (169 lb 8 oz).      "

## 2024-05-08 NOTE — ANESTHESIA CARE TRANSFER NOTE
Patient: Анна Alexander    Procedure: Procedure(s):  HYSTEROSCOPY, WITH DILATION AND CURETTAGE OF UTERUS and polypectomy with myosure,  Removal and reinsertion of Mirena intrauterine device       Diagnosis: Abnormal uterine bleeding (AUB) [N93.9]  Endometrial polyp [N84.0]  Diagnosis Additional Information: No value filed.    Anesthesia Type:   General     Note:    Oropharynx: oropharynx clear of all foreign objects and spontaneously breathing  Level of Consciousness: drowsy  Oxygen Supplementation: face mask  Level of Supplemental Oxygen (L/min / FiO2): 10  Independent Airway: airway patency satisfactory and stable  Dentition: dentition unchanged  Vital Signs Stable: post-procedure vital signs reviewed and stable  Report to RN Given: handoff report given  Patient transferred to: PACU    Handoff Report: Identifed the Patient, Identified the Reponsible Provider, Reviewed the pertinent medical history, Discussed the surgical course, Reviewed Intra-OP anesthesia mangement and issues during anesthesia, Set expectations for post-procedure period and Allowed opportunity for questions and acknowledgement of understanding      Vitals:  Vitals Value Taken Time   /70 05/08/24 1040   Temp 96.6  F (35.9  C) 05/08/24 1041   Pulse 84 05/08/24 1042   Resp 22 05/08/24 1042   SpO2 98 % 05/08/24 1042   Vitals shown include unfiled device data.    Electronically Signed By: MARCK Mitchell CRNA  May 8, 2024  10:42 AM

## 2024-05-08 NOTE — ANESTHESIA POSTPROCEDURE EVALUATION
Patient: Анна Alexander    Procedure: Procedure(s):  HYSTEROSCOPY, WITH DILATION AND CURETTAGE OF UTERUS and polypectomy with myosure,  Removal and reinsertion of Mirena intrauterine device       Anesthesia Type:  General    Note:     Postop Pain Control: Uneventful            Sign Out: Well controlled pain   PONV: No   Neuro/Psych: Uneventful            Sign Out: Acceptable/Baseline neuro status   Airway/Respiratory:             Sign Out: Acceptable/Baseline resp. status   CV/Hemodynamics:             Sign Out: Acceptable CV status   Other NRE:    DID A NON-ROUTINE EVENT OCCUR?            Last vitals:  Vitals Value Taken Time   /71 05/08/24 1110   Temp 96.6  F (35.9  C) 05/08/24 1041   Pulse 72 05/08/24 1120   Resp 14 05/08/24 1120   SpO2 100 % 05/08/24 1132   Vitals shown include unfiled device data.    Electronically Signed By: Fco Yanes DO  May 8, 2024  1:16 PM   Olanzapine Pregnancy And Lactation Text: This medication is pregnancy category C.   There are no adequate and well controlled trials with olanzapine in pregnant females.  Olanzapine should be used during pregnancy only if the potential benefit justifies the potential risk to the fetus.   In a study in lactating healthy women, olanzapine was excreted in breast milk.  It is recommended that women taking olanzapine should not breast feed.

## 2024-05-13 ENCOUNTER — OFFICE VISIT (OUTPATIENT)
Dept: OBGYN | Facility: CLINIC | Age: 39
End: 2024-05-13
Payer: COMMERCIAL

## 2024-05-13 VITALS — DIASTOLIC BLOOD PRESSURE: 74 MMHG | WEIGHT: 170.4 LBS | SYSTOLIC BLOOD PRESSURE: 118 MMHG | BODY MASS INDEX: 26.69 KG/M2

## 2024-05-13 DIAGNOSIS — N84.0 ENDOMETRIAL POLYP: ICD-10-CM

## 2024-05-13 DIAGNOSIS — N93.9 ABNORMAL UTERINE BLEEDING (AUB): Primary | ICD-10-CM

## 2024-05-13 PROCEDURE — 99024 POSTOP FOLLOW-UP VISIT: CPT | Performed by: STUDENT IN AN ORGANIZED HEALTH CARE EDUCATION/TRAINING PROGRAM

## 2024-05-13 NOTE — NURSING NOTE
"Chief Complaint   Patient presents with    Post-op Visit     Surgery  2024  Hysteroscopy D & C  Mirena IUD insert         Initial /74 (BP Location: Left arm, Cuff Size: Adult Regular)   Wt 77.3 kg (170 lb 6.4 oz)   LMP  (LMP Unknown)   BMI 26.69 kg/m   Estimated body mass index is 26.69 kg/m  as calculated from the following:    Height as of 24: 1.702 m (5' 7\").    Weight as of this encounter: 77.3 kg (170 lb 6.4 oz).  BP completed using cuff size: regular    Questioned patient about current smoking habits.  Pt. has never smoked.          The following HM Due: NONE      Vivi Simons, TRISTAN on 2024 at 2:12 PM         "

## 2024-05-13 NOTE — PROGRESS NOTES
[facility]  Postoperative Visit    CC: Postoperative visit    S:  Анна Alexander is a 38 year old  who presents for a postop visit following a hysteroscopy polypectomy and Mirena IUD removal/reinsertion on 24 for AUB with benign polypoid tissue on pathology. She is here alone.    - Feeling good. Has been jogging several times. Has had bloating the last few days but passing gas, having BM, tolerating PO. No F/C, CP, SOB, N/V.   - Also feels she can't hold her urine but no dysuria. Ie went for a run and had to pee in the middle of it.  - No bleeding or discharge    O:  /74 (BP Location: Left arm, Cuff Size: Adult Regular)   Wt 77.3 kg (170 lb 6.4 oz)   LMP  (LMP Unknown)   BMI 26.69 kg/m      Exam:  GEN: Appears well, NAD  CV: Well perfused  PULM: NWOB  ABD: Soft, non-tender, non-distended, dull to percussion  SKIN: Appears normal without rashes or lesions.  EXT: Warm, well perfused, no edema    Labs:  Pathology Report 24:  Final Diagnosis   A. Endometrium, curettage:  -Benign endometrial polyp.  -Multiple fragments of polypoid, inactive endometrial tissue with pseudodecidualized stroma, suggestive of exogenous hormone effect.     A/P:  Анна Alexander is a 38 year old  who presents for a postop visit following a hysteroscopy polypectomy and Mirena IUD removal/reinsertion on 24 for AUB with benign polypoid tissue on pathology.     #Postop Status  - Doing well  - Bloating, but no c/f for intra-op injury. Discussed simethicone, declines at this time.    Deyvi Simms MD MPH  [facility]  2024 1:57 PM

## 2024-08-20 ENCOUNTER — OFFICE VISIT (OUTPATIENT)
Dept: DERMATOLOGY | Facility: CLINIC | Age: 39
End: 2024-08-20
Attending: PHYSICIAN ASSISTANT
Payer: COMMERCIAL

## 2024-08-20 DIAGNOSIS — D18.01 CHERRY ANGIOMA: ICD-10-CM

## 2024-08-20 DIAGNOSIS — Z12.83 ENCOUNTER FOR SCREENING FOR MALIGNANT NEOPLASM OF SKIN: ICD-10-CM

## 2024-08-20 DIAGNOSIS — Z12.83 SKIN CANCER SCREENING: ICD-10-CM

## 2024-08-20 DIAGNOSIS — D22.9 MULTIPLE NEVI: ICD-10-CM

## 2024-08-20 DIAGNOSIS — L28.1 PRURIGO NODULARIS: ICD-10-CM

## 2024-08-20 DIAGNOSIS — L82.1 SEBORRHEIC KERATOSES: ICD-10-CM

## 2024-08-20 DIAGNOSIS — L57.0 ACTINIC KERATOSES: ICD-10-CM

## 2024-08-20 DIAGNOSIS — L98.491 NONHEALING SKIN ULCER, LIMITED TO BREAKDOWN OF SKIN (H): Primary | ICD-10-CM

## 2024-08-20 DIAGNOSIS — L81.4 LENTIGINES: ICD-10-CM

## 2024-08-20 DIAGNOSIS — L73.9 FOLLICULITIS: ICD-10-CM

## 2024-08-20 PROCEDURE — 17000 DESTRUCT PREMALG LESION: CPT | Mod: XS | Performed by: PHYSICIAN ASSISTANT

## 2024-08-20 PROCEDURE — 17110 DESTRUCTION B9 LES UP TO 14: CPT | Performed by: PHYSICIAN ASSISTANT

## 2024-08-20 PROCEDURE — 99204 OFFICE O/P NEW MOD 45 MIN: CPT | Mod: 25 | Performed by: PHYSICIAN ASSISTANT

## 2024-08-20 RX ORDER — CLINDAMYCIN PHOSPHATE 10 UG/ML
LOTION TOPICAL 2 TIMES DAILY
Qty: 60 ML | Refills: 4 | Status: SHIPPED | OUTPATIENT
Start: 2024-08-20

## 2024-08-20 ASSESSMENT — PAIN SCALES - GENERAL: PAINLEVEL: NO PAIN (0)

## 2024-08-20 NOTE — PROGRESS NOTES
[facility] Dermatology Note  Encounter Date: Aug 20, 2024  Office Visit     Reviewed patients past medical history and pertinent chart review prior to patients visit today.     Dermatology Problem List:  # Folliculitis  - OTC BPO wash, clindamycin 1% lotion  # Actinic keratosis  - cryotherapy 8/20/2024    No personal history of skin cancer.  No family history of skin cancer.    Social history: Previously lived in [place]   ____________________________________________    Assessment & Plan:     #Actinic keratosis x1  Cryotherapy procedure note, location(s): left nasal ala After verbal consent and discussion of risks and benefits including, but not limited to, dyspigmentation/scar, blister, and pain, the lesion(s) was(were) treated with 1-2 mm freeze border for 1-2 cycles with liquid nitrogen. Post cryotherapy instructions were provided.    # Nonhealing crust, left nasal vestibule  - I recommended ENT consult for evaluation. Order placed.     # Folliculitis  - We reviewed the etiology of this skin condition.   - Start benzoyl peroxide 10% cleanser. Lather in the shower and rinse completely. We discussed that this may bleach towels and clothing if not rinsed completely.   - Start clindamycin 1% lotion twice daily.     # Prurigo nodules, left calf and right 3rd finger  - We discussed the benign nature of the skin lesions. The patient expresses interest in cryotherapy for the prurigo nodule on the right 3rd finger. We discussed possible incomplete resolution and risk of recurrence.    - Cryotherapy procedure note, location(s): right 3rd finger. After verbal consent and discussion of risks and benefits including, but not limited to, dyspigmentation/scar, blister, and pain, 1 lesion(s) was(were) treated with 1-2 mm freeze border for 1-2 cycles with liquid nitrogen. Post cryotherapy instructions were provided.     # Multiple nevi, trunk and extremities  # Solar lentigines  - No concerning features on  dermoscopy. We discussed the importance of self exams at home. ABCDE criteria and importance of photoprotection reviewed.     # Cherry angiomas  # Seborrheic keratoses  - We discussed the benign nature of the skin lesions. No treatment required. Continued observation recommended. Follow up with any concerns.      Follow-up:  Annual for follow up full body skin exam, as needed for new or changing lesions or new concerns    All risks, benefits and alternatives were discussed with patient.  Patient is in agreement and understands the assessment and plan.  All questions were answered.  Juana Casarez PA-C  [facility] Dermatology    ____________________________________________    CC: Skin Check (Анна is here today for a skin check and is concerned about a lesion on the left side of her nose. And states she has multiple other things.)    HPI:  Ms. Анна Alexander is a(n) 39 year old female who presents today as a return patient for a full body skin cancer screening. The patient requests evaluation of a lesion on the left nose. 6 months ago she developed sores on the outside and inside of the nose. The sore on the outside of the nose healed, the sore on the inside of the nose has not healed. She has applied Vaseline without improvement. Second, she notes a new lesion on the right 3rd finger. No pain, itching, or bleeding at the site. She finds herself picking at the lesion. Finally, she reports recent acne lesions on the buttocks. She has not tried anything for this yet. No other specific cutaneous concerns today. The patient reports trying to be diligent with photoprotection.      Physical Exam:  Vitals: There were no vitals taken for this visit.  SKIN: Total skin excluding the genitalia areas was performed. The exam included the scalp, face, neck, bilateral arms, chest, back, abdomen, bilateral legs, digits, mons pubis, buttocks, and nails.   - Easley II.  - Pink macule(s) with gritty scale involving the  left nasal ala, consistent with actinic keratosis.   - Scattered on the buttocks are perifollicular pustules, consistent with folliculitis.   - The left calf and right 3rd finger demonstrates firm, crusted papules, consistent with prurigo nodularis.   - Multiple tan/brown macules and papules scattered throughout exam, consistent with benign nevi. No concerning features on dermoscopy.   - Scattered tan, homogenous macules scattered on sun exposed skin, consistent with solar lentigines.   - Scattered waxy, stuck on appearing papules and patches, consistent with seborrheic keratoses.    - Several 1-2 mm red dome shaped symmetric papules, consistent with cherry angiomas.     Medications:  No current outpatient medications on file.     No current facility-administered medications for this visit.      Past Medical History:   Patient Active Problem List   Diagnosis    Cervical high risk HPV (human papillomavirus) test positive    BMI 26.0-26.9,adult    Family history of malignant neoplasm of breast    Hair loss     Past Medical History:   Diagnosis Date    Cervical high risk HPV (human papillomavirus) test positive 02/11/2021 02/11/21    History of blood transfusion July 2014    Blood loss from child birth       ARGENTINA Bryant PA-C  [address] [facility] [place] [address] on close of this encounter.

## 2024-08-20 NOTE — LETTER
8/20/2024       RE: Анна Alexander  [address] [place] [address]     Dear Colleague,    Thank you for referring your patient, Анна Alexander, to the [facility] [place] [facility]. Please see a copy of my visit note below.    [facility] Dermatology Note  Encounter Date: Aug 20, 2024  Office Visit     Reviewed patients past medical history and pertinent chart review prior to patients visit today.     Dermatology Problem List:  # Folliculitis  - OTC BPO wash, clindamycin 1% lotion  # Actinic keratosis  - cryotherapy 8/20/2024    No personal history of skin cancer.  No family history of skin cancer.    Social history: Previously lived in [place]   ____________________________________________    Assessment & Plan:     #Actinic keratosis x1  Cryotherapy procedure note, location(s): left nasal ala After verbal consent and discussion of risks and benefits including, but not limited to, dyspigmentation/scar, blister, and pain, the lesion(s) was(were) treated with 1-2 mm freeze border for 1-2 cycles with liquid nitrogen. Post cryotherapy instructions were provided.    # Nonhealing crust, left nasal vestibule  - I recommended ENT consult for evaluation. Order placed.     # Folliculitis  - We reviewed the etiology of this skin condition.   - Start benzoyl peroxide 10% cleanser. Lather in the shower and rinse completely. We discussed that this may bleach towels and clothing if not rinsed completely.   - Start clindamycin 1% lotion twice daily.     # Prurigo nodules, left calf and right 3rd finger  - We discussed the benign nature of the skin lesions. The patient expresses interest in cryotherapy for the prurigo nodule on the right 3rd finger. We discussed possible incomplete resolution and risk of recurrence.    - Cryotherapy procedure note, location(s): right 3rd finger. After verbal consent and discussion of risks  and benefits including, but not limited to, dyspigmentation/scar, blister, and pain, 1 lesion(s) was(were) treated with 1-2 mm freeze border for 1-2 cycles with liquid nitrogen. Post cryotherapy instructions were provided.     # Multiple nevi, trunk and extremities  # Solar lentigines  - No concerning features on dermoscopy. We discussed the importance of self exams at home. ABCDE criteria and importance of photoprotection reviewed.     # Cherry angiomas  # Seborrheic keratoses  - We discussed the benign nature of the skin lesions. No treatment required. Continued observation recommended. Follow up with any concerns.      Follow-up:  Annual for follow up full body skin exam, as needed for new or changing lesions or new concerns    All risks, benefits and alternatives were discussed with patient.  Patient is in agreement and understands the assessment and plan.  All questions were answered.  Juana Casarez PA-C  [facility] Dermatology    ____________________________________________    CC: Skin Check (Анна is here today for a skin check and is concerned about a lesion on the left side of her nose. And states she has multiple other things.)    HPI:  Ms. Анна Alexander is a(n) 39 year old female who presents today as a return patient for a full body skin cancer screening. The patient requests evaluation of a lesion on the left nose. 6 months ago she developed sores on the outside and inside of the nose. The sore on the outside of the nose healed, the sore on the inside of the nose has not healed. She has applied Vaseline without improvement. Second, she notes a new lesion on the right 3rd finger. No pain, itching, or bleeding at the site. She finds herself picking at the lesion. Finally, she reports recent acne lesions on the buttocks. She has not tried anything for this yet. No other specific cutaneous concerns today. The patient reports trying to be diligent with photoprotection.      Physical  Exam:  Vitals: There were no vitals taken for this visit.  SKIN: Total skin excluding the genitalia areas was performed. The exam included the scalp, face, neck, bilateral arms, chest, back, abdomen, bilateral legs, digits, mons pubis, buttocks, and nails.   - Easley II.  - Pink macule(s) with gritty scale involving the left nasal ala, consistent with actinic keratosis.   - Scattered on the buttocks are perifollicular pustules, consistent with folliculitis.   - The left calf and right 3rd finger demonstrates firm, crusted papules, consistent with prurigo nodularis.   - Multiple tan/brown macules and papules scattered throughout exam, consistent with benign nevi. No concerning features on dermoscopy.   - Scattered tan, homogenous macules scattered on sun exposed skin, consistent with solar lentigines.   - Scattered waxy, stuck on appearing papules and patches, consistent with seborrheic keratoses.    - Several 1-2 mm red dome shaped symmetric papules, consistent with cherry angiomas.     Medications:  No current outpatient medications on file.     No current facility-administered medications for this visit.      Past Medical History:   Patient Active Problem List   Diagnosis     Cervical high risk HPV (human papillomavirus) test positive     BMI 26.0-26.9,adult     Family history of malignant neoplasm of breast     Hair loss     Past Medical History:   Diagnosis Date     Cervical high risk HPV (human papillomavirus) test positive 02/11/2021 02/11/21     History of blood transfusion July 2014    Blood loss from child birth       ARGENTINA Bryant PA-C  [address] [place] [address] on close of this encounter.      Again, thank you for allowing me to participate in the care of your patient.      Sincerely,    Juana Casarez PA-C

## 2024-08-20 NOTE — NURSING NOTE
Dermatology Rooming Note    Анна Alexander's goals for this visit include:   Chief Complaint   Patient presents with    Skin Check     Анна is here today for a skin check and is concerned about a lesion on the left side of her nose. And states she has multiple other things.     Du KELLER, CMA

## 2024-08-23 ENCOUNTER — TELEPHONE (OUTPATIENT)
Dept: DERMATOLOGY | Facility: CLINIC | Age: 39
End: 2024-08-23
Payer: COMMERCIAL

## 2024-08-23 NOTE — TELEPHONE ENCOUNTER
Left Voicemail (1st Attempt) and Sent Mychart (1st Attempt) for the patient to call back and schedule the following:    Appointment type: Return dermatology  Provider: Juana Casarez PA-C  Return date: Approx. 8/20/25   Specialty phone number: [phone]    Additional Notes:

## 2024-08-27 NOTE — TELEPHONE ENCOUNTER
Left Voicemail (2nd Attempt) for the patient to call back and schedule the following:    Appointment type: Return dermatology  Provider: Juana Casarez PA-C  Return date: Approx. 8/20/25   Specialty phone number: [phone]

## 2025-02-03 ENCOUNTER — ANCILLARY PROCEDURE (OUTPATIENT)
Dept: MRI IMAGING | Facility: CLINIC | Age: 40
End: 2025-02-03
Attending: PHYSICIAN ASSISTANT
Payer: COMMERCIAL

## 2025-02-03 DIAGNOSIS — Z91.89 AT HIGH RISK FOR BREAST CANCER: ICD-10-CM

## 2025-02-03 PROCEDURE — 77049 MRI BREAST C-+ W/CAD BI: CPT

## 2025-02-03 PROCEDURE — A9585 GADOBUTROL INJECTION: HCPCS

## 2025-02-03 RX ORDER — GADOBUTROL 604.72 MG/ML
10 INJECTION INTRAVENOUS ONCE
Status: COMPLETED | OUTPATIENT
Start: 2025-02-03 | End: 2025-02-03

## 2025-02-03 RX ADMIN — GADOBUTROL 8 ML: 604.72 INJECTION INTRAVENOUS at 13:20

## 2025-02-25 ENCOUNTER — OFFICE VISIT (OUTPATIENT)
Dept: PEDIATRICS | Facility: CLINIC | Age: 40
End: 2025-02-25
Attending: PHYSICIAN ASSISTANT
Payer: COMMERCIAL

## 2025-02-25 VITALS
DIASTOLIC BLOOD PRESSURE: 83 MMHG | OXYGEN SATURATION: 100 % | WEIGHT: 179.3 LBS | RESPIRATION RATE: 19 BRPM | HEIGHT: 67 IN | HEART RATE: 85 BPM | SYSTOLIC BLOOD PRESSURE: 124 MMHG | TEMPERATURE: 98 F | BODY MASS INDEX: 28.14 KG/M2

## 2025-02-25 DIAGNOSIS — Z00.00 ROUTINE GENERAL MEDICAL EXAMINATION AT A HEALTH CARE FACILITY: Primary | ICD-10-CM

## 2025-02-25 DIAGNOSIS — R21 RASH: ICD-10-CM

## 2025-02-25 DIAGNOSIS — Z13.1 SCREENING FOR DIABETES MELLITUS: ICD-10-CM

## 2025-02-25 DIAGNOSIS — Z13.220 LIPID SCREENING: ICD-10-CM

## 2025-02-25 DIAGNOSIS — Z82.79 FAMILY HISTORY OF BICUSPID AORTIC VALVE: ICD-10-CM

## 2025-02-25 LAB
EST. AVERAGE GLUCOSE BLD GHB EST-MCNC: 105 MG/DL
HBA1C MFR BLD: 5.3 % (ref 0–5.6)

## 2025-02-25 PROCEDURE — 83036 HEMOGLOBIN GLYCOSYLATED A1C: CPT | Performed by: PHYSICIAN ASSISTANT

## 2025-02-25 PROCEDURE — 90480 ADMN SARSCOV2 VAC 1/ONLY CMP: CPT | Performed by: PHYSICIAN ASSISTANT

## 2025-02-25 PROCEDURE — 80061 LIPID PANEL: CPT | Performed by: PHYSICIAN ASSISTANT

## 2025-02-25 PROCEDURE — G2211 COMPLEX E/M VISIT ADD ON: HCPCS | Performed by: PHYSICIAN ASSISTANT

## 2025-02-25 PROCEDURE — 36415 COLL VENOUS BLD VENIPUNCTURE: CPT | Performed by: PHYSICIAN ASSISTANT

## 2025-02-25 PROCEDURE — 99213 OFFICE O/P EST LOW 20 MIN: CPT | Mod: 25 | Performed by: PHYSICIAN ASSISTANT

## 2025-02-25 PROCEDURE — 90656 IIV3 VACC NO PRSV 0.5 ML IM: CPT | Performed by: PHYSICIAN ASSISTANT

## 2025-02-25 PROCEDURE — 91320 SARSCV2 VAC 30MCG TRS-SUC IM: CPT | Performed by: PHYSICIAN ASSISTANT

## 2025-02-25 PROCEDURE — 99396 PREV VISIT EST AGE 40-64: CPT | Mod: 25 | Performed by: PHYSICIAN ASSISTANT

## 2025-02-25 PROCEDURE — 90471 IMMUNIZATION ADMIN: CPT | Performed by: PHYSICIAN ASSISTANT

## 2025-02-25 RX ORDER — TRIAMCINOLONE ACETONIDE 1 MG/G
OINTMENT TOPICAL 2 TIMES DAILY
Qty: 15 G | Refills: 1 | Status: SHIPPED | OUTPATIENT
Start: 2025-02-25

## 2025-02-25 SDOH — HEALTH STABILITY: PHYSICAL HEALTH: ON AVERAGE, HOW MANY MINUTES DO YOU ENGAGE IN EXERCISE AT THIS LEVEL?: 40 MIN

## 2025-02-25 SDOH — HEALTH STABILITY: PHYSICAL HEALTH: ON AVERAGE, HOW MANY DAYS PER WEEK DO YOU ENGAGE IN MODERATE TO STRENUOUS EXERCISE (LIKE A BRISK WALK)?: 1 DAY

## 2025-02-25 ASSESSMENT — SOCIAL DETERMINANTS OF HEALTH (SDOH): HOW OFTEN DO YOU GET TOGETHER WITH FRIENDS OR RELATIVES?: MORE THAN THREE TIMES A WEEK

## 2025-02-25 NOTE — PROGRESS NOTES
"Preventive Care Visit  [facility] JENNIFER Bryant PA-C, Physician Assistant  Feb 25, 2025      Assessment & Plan     Routine general medical examination at a health care facility  Discussed with patient general preventative health measures, including but not limited to healthy diet, exercise, alcohol use, drug use, immunizations, mood, and preventative screenings.   Schedule annual physical in one year.     Family history of bicuspid aortic valve  No symptoms  No others in family with similar.   - Echocardiogram Complete    Screening for diabetes mellitus    - Hemoglobin A1c  - Hemoglobin A1c    Lipid screening    - Lipid panel reflex to direct LDL Non-fasting  - Lipid panel reflex to direct LDL Non-fasting    Rash  Possible eczema, comes and goes. Continue to moisturize well. Triamcinolone bid x 7 day  Follow up prn   - triamcinolone (KENALOG) 0.1 % external ointment  Dispense: 15 g; Refill: 1              BMI  Estimated body mass index is 28.08 kg/m  as calculated from the following:    Height as of this encounter: 1.702 m (5' 7\").    Weight as of this encounter: 81.3 kg (179 lb 4.8 oz).       Counseling  Appropriate preventive services were addressed with this patient via screening, questionnaire, or discussion as appropriate for fall prevention, nutrition, physical activity, Tobacco-use cessation, social engagement, weight loss and cognition.  Checklist reviewing preventive services available has been given to the patient.  Reviewed patient's diet, addressing concerns and/or questions.   She is at risk for lack of exercise and has been provided with information to increase physical activity for the benefit of her well-being.           Mary Jj is a 39 year old, presenting for the following:  Wellness Visit           HPI  Mom with bicuspid aortic valve, no genetic testing,   Pt has no symptoms          Health Care Directive  Patient does not have a Health Care Directive: Discussed " advance care planning with patient; information given to patient to review.      2/25/2025   General Health   How would you rate your overall physical health? Good   Feel stress (tense, anxious, or unable to sleep) Only a little   (!) STRESS CONCERN      2/25/2025   Nutrition   Three or more servings of calcium each day? Yes   Diet: Regular (no restrictions)   How many servings of fruit and vegetables per day? 4 or more   How many sweetened beverages each day? 0-1         2/25/2025   Exercise   Days per week of moderate/strenous exercise 1 day   Average minutes spent exercising at this level 40 min   (!) EXERCISE CONCERN      2/25/2025   Social Factors   Frequency of gathering with friends or relatives More than three times a week   Worry food won't last until get money to buy more No   Food not last or not have enough money for food? No   Do you have housing? (Housing is defined as stable permanent housing and does not include staying ouside in a car, in a tent, in an abandoned building, in an overnight shelter, or couch-surfing.) Yes   Are you worried about losing your housing? No   Lack of transportation? No   Unable to get utilities (heat,electricity)? No         2/25/2025   Dental   Dentist two times every year? Yes         2/20/2024   TB Screening   Were you born outside of the [place]? No         Today's PHQ-2 Score:       2/25/2025    12:21 PM   PHQ-2 ( 1999 [employer])   Q1: Little interest or pleasure in doing things 1   Q2: Feeling down, depressed or hopeless 0   PHQ-2 Score 1    Q1: Little interest or pleasure in doing things Several days   Q2: Feeling down, depressed or hopeless Not at all   PHQ-2 Score 1       Patient-reported           2/25/2025   Substance Use   Alcohol more than 3/day or more than 7/wk No   Do you use any other substances recreationally? No     Social History     Tobacco Use    Smoking status: Never    Smokeless tobacco: Never   Vaping Use    Vaping status: Never Used   Substance Use Topics  "   Alcohol use: Yes     Comment: 16 per week.    Drug use: Never           5/2/2024   LAST FHS-7 RESULTS   1st degree relative breast or ovarian cancer Yes   Any relative bilateral breast cancer No   Any male have breast cancer No   Any ONE woman have BOTH breast AND ovarian cancer No   Any woman with breast cancer before 50yrs No   2 or more relatives with breast AND/OR ovarian cancer Yes   2 or more relatives with breast AND/OR bowel cancer No        Mammogram Screening - Annual screen due to greater than 20% lifetime risk as estimated by Breast Cancer Risk Calculator        2/25/2025   STI Screening   New sexual partner(s) since last STI/HIV test? (!) YES      History of abnormal Pap smear: No - age 30-64 HPV with reflex Pap every 5 years recommended        Latest Ref Rng & Units 2/22/2024     2:36 PM 2/21/2023     2:32 PM 2/11/2021     1:35 PM   PAP / HPV   PAP  Negative for Intraepithelial Lesion or Malignancy (NILM)  Negative for Intraepithelial Lesion or Malignancy (NILM)     PAP (Historical)    NIL    HPV 16 DNA Negative Negative  Negative  Negative    HPV 18 DNA Negative Negative  Negative  Negative    Other HR HPV Negative Negative  Negative  Positive      ASCVD Risk   The 10-year ASCVD risk score (Binta CUEVAS, et al., 2019) is: 0.2%    Values used to calculate the score:      Age: 39 years      Sex: Female      Is Non- : No      Diabetic: No      Tobacco smoker: No      Systolic Blood Pressure: 118 mmHg      Is BP treated: No      HDL Cholesterol: 96 mg/dL      Total Cholesterol: 209 mg/dL        2/25/2025   Contraception/Family Planning   Questions about contraception or family planning No        Reviewed and updated as needed this visit by Provider   Tobacco                           Objective    Exam  There were no vitals taken for this visit.   Estimated body mass index is 26.69 kg/m  as calculated from the following:    Height as of 5/1/24: 1.702 m (5' 7\").    Weight " as of 5/13/24: 77.3 kg (170 lb 6.4 oz).    Physical Exam  GENERAL: alert and no distress  EYES: Eyes grossly normal to inspection, PERRL and conjunctivae and sclerae normal  HENT: ear canals and TM's normal, nose and mouth without ulcers or lesions  NECK: no adenopathy, no asymmetry, masses, or scars  RESP: lungs clear to auscultation - no rales, rhonchi or wheezes  CV: regular rate and rhythm, normal S1 S2, no S3 or S4, no murmur, click or rub, no peripheral edema  ABDOMEN: soft, nontender, no hepatosplenomegaly, no masses and bowel sounds normal  MS: no gross musculoskeletal defects noted, no edema  SKIN: rash, post neck C5 area, rough appearing, no pustiles  NEURO: Normal strength and tone, mentation intact and speech normal  PSYCH: mentation appears normal, affect normal/bright        Signed Electronically by: Shabnam Bryant PA-C

## 2025-02-25 NOTE — PATIENT INSTRUCTIONS
Patient Education   Preventive Care Advice   This is general advice given by our system to help you stay healthy. However, your care team may have specific advice just for you. Please talk to your care team about your preventive care needs.  Nutrition  Eat 5 or more servings of fruits and vegetables each day.  Try wheat bread, brown rice and whole grain pasta (instead of white bread, rice, and pasta).  Get enough calcium and vitamin D. Check the label on foods and aim for 100% of the RDA (recommended daily allowance).  Lifestyle  Exercise at least 150 minutes each week  (30 minutes a day, 5 days a week).  Do muscle strengthening activities 2 days a week. These help control your weight and prevent disease.  No smoking.  Wear sunscreen to prevent skin cancer.  Have a dental exam and cleaning every 6 months.  Yearly exams  See your health care team every year to talk about:  Any changes in your health.  Any medicines your care team has prescribed.  Preventive care, family planning, and ways to prevent chronic diseases.  Shots (vaccines)   HPV shots (up to age 26), if you've never had them before.  Hepatitis B shots (up to age 59), if you've never had them before.  COVID-19 shot: Get this shot when it's due.  Flu shot: Get a flu shot every year.  Tetanus shot: Get a tetanus shot every 10 years.  Pneumococcal, hepatitis A, and RSV shots: Ask your care team if you need these based on your risk.  Shingles shot (for age 50 and up)  General health tests  Diabetes screening:  Starting at age 35, Get screened for diabetes at least every 3 years.  If you are younger than age 35, ask your care team if you should be screened for diabetes.  Cholesterol test: At age 39, start having a cholesterol test every 5 years, or more often if advised.  Bone density scan (DEXA): At age 50, ask your care team if you should have this scan for osteoporosis (brittle bones).  Hepatitis C: Get tested at least once in your life.  STIs (sexually  transmitted infections)  Before age 24: Ask your care team if you should be screened for STIs.  After age 24: Get screened for STIs if you're at risk. You are at risk for STIs (including HIV) if:  You are sexually active with more than one person.  You don't use condoms every time.  You or a partner was diagnosed with a sexually transmitted infection.  If you are at risk for HIV, ask about PrEP medicine to prevent HIV.  Get tested for HIV at least once in your life, whether you are at risk for HIV or not.  Cancer screening tests  Cervical cancer screening: If you have a cervix, begin getting regular cervical cancer screening tests starting at age 21.  Breast cancer scan (mammogram): If you've ever had breasts, begin having regular mammograms starting at age 40. This is a scan to check for breast cancer.  Colon cancer screening: It is important to start screening for colon cancer at age 45.  Have a colonoscopy test every 10 years (or more often if you're at risk) Or, ask your provider about stool tests like a FIT test every year or Cologuard test every 3 years.  To learn more about your testing options, visit:   .  For help making a decision, visit:   https://bit.ly/yh08364.  Prostate cancer screening test: If you have a prostate, ask your care team if a prostate cancer screening test (PSA) at age 55 is right for you.  Lung cancer screening: If you are a current or former smoker ages 50 to 80, ask your care team if ongoing lung cancer screenings are right for you.  For informational purposes only. Not to replace the advice of your health care provider. Copyright   2023 [facility]. All rights reserved. Clinically reviewed by the [facility] Program. [employer] 091132 - REV 01/24.  Safer Sex: Care Instructions  Overview  Safer sex is a way to reduce your risk of getting a sexually transmitted infection (STI). It can also help prevent pregnancy.  Several products can help you practice  safer sex and reduce your chance of STIs. One of the best is a condom. There are internal and external condoms. You can use a special rubber sheet (dental dam) for protection during oral sex. Disposable gloves can keep your hands from touching blood, semen, or other body fluids that can carry infections.  Remember that birth control methods such as diaphragms, IUDs, foams, and birth control pills do not stop you from getting STIs.  Follow-up care is a key part of your treatment and safety. Be sure to make and go to all appointments, and call your doctor if you are having problems. It's also a good idea to know your test results and keep a list of the medicines you take.  How can you care for yourself at home?  Think about getting vaccinated to help prevent hepatitis A, hepatitis B, and human papillomavirus (HPV). They can be spread through sex.  Use a condom every time you have sex. Use an external condom, which goes on the penis. Or use an internal condom, which goes into the vagina or anus.  Make sure you use the right size external condom. A condom that's too small can break easily. A condom that's too big can slip off during sex.  Use a new condom each time you have sex. Be careful not to poke a hole in the condom when you open the wrapper.  Don't use an internal condom and an external condom at the same time.  Never use petroleum jelly (such as Vaseline), grease, hand lotion, baby oil, or anything with oil in it. These products can make holes in the condom.  After intercourse, hold the edge of the condom as you remove it. This will help keep semen from spilling out of the condom.  Do not have sex with anyone who has symptoms of an STI, such as sores on the genitals or mouth.  Do not drink a lot of alcohol or use drugs before sex.  Limit your sex partners. Sex with one partner who has sex only with you can reduce your risk of getting an STI.  Don't share sex toys. But if you do share them, use a condom and clean  "the sex toys between each use.  Talk to any partners before you have sex. Talk about what you feel comfortable with and whether you have any boundaries with sex. And find out if your partner or partners may be at risk for any STI. Keep in mind that a person may be able to spread an STI even if they do not have symptoms. You and any partners may want to get tested for STIs.  Where can you learn more?  Go to https://www.[employer].net/patiented  Enter B608 in the search box to learn more about \"Safer Sex: Care Instructions.\"  Current as of: April 30, 2024  Content Version: 14.3    2024 [employer].   Care instructions adapted under license by your healthcare professional. If you have questions about a medical condition or this instruction, always ask your healthcare professional. [employer] Website disclaims any warranty or liability for your use of this information.       "

## 2025-02-26 LAB
CHOLEST SERPL-MCNC: 184 MG/DL
FASTING STATUS PATIENT QL REPORTED: NORMAL
HDLC SERPL-MCNC: 86 MG/DL
LDLC SERPL CALC-MCNC: 69 MG/DL
NONHDLC SERPL-MCNC: 98 MG/DL
TRIGL SERPL-MCNC: 147 MG/DL

## 2025-05-06 ENCOUNTER — E-VISIT (OUTPATIENT)
Dept: URGENT CARE | Facility: CLINIC | Age: 40
End: 2025-05-06
Payer: COMMERCIAL

## 2025-05-06 DIAGNOSIS — H10.33 ACUTE BACTERIAL CONJUNCTIVITIS OF BOTH EYES: Primary | ICD-10-CM

## 2025-05-06 PROCEDURE — 99207 PR NON-BILLABLE SERV PER CHARTING: CPT | Performed by: EMERGENCY MEDICINE

## 2025-05-06 RX ORDER — POLYMYXIN B SULFATE AND TRIMETHOPRIM 1; 10000 MG/ML; [USP'U]/ML
SOLUTION OPHTHALMIC
Qty: 10 ML | Refills: 0 | Status: SHIPPED | OUTPATIENT
Start: 2025-05-06 | End: 2025-05-13

## 2025-05-06 RX ORDER — KETOTIFEN FUMARATE 0.35 MG/ML
SOLUTION/ DROPS OPHTHALMIC
Qty: 5 ML | Refills: 3 | Status: SHIPPED | OUTPATIENT
Start: 2025-05-06

## 2025-05-06 NOTE — PATIENT INSTRUCTIONS
Thank you for choosing us for your care. I have placed an order for a prescription so that you can start treatment. View your full visit summary for details by clicking on the link below. Your pharmacist will able to address any questions you may have about the medication.     If you re not feeling better within 2-3 days, please schedule an appointment.  You can schedule an appointment right here in [facility], or call [phone]  If the visit is for the same symptoms as your eVisit, we ll refund the cost of your eVisit if seen within seven days.    Pinkeye: Care Instructions  Overview     Pinkeye is redness and swelling of the eye surface and the conjunctiva (the lining of the eyelid and the covering of the white part of the eye). Pinkeye is also called conjunctivitis. Pinkeye is often caused by infection with bacteria or a virus. Dry air, allergies, smoke, and chemicals are other common causes.  Pinkeye often gets better on its own in 7 to 10 days. Antibiotics only help if the pinkeye is caused by bacteria. Pinkeye caused by infection spreads easily. If an allergy or chemical is causing pinkeye, it will not go away unless you can avoid whatever is causing it.  Follow-up care is a key part of your treatment and safety. Be sure to make and go to all appointments, and call your doctor if you are having problems. It's also a good idea to know your test results and keep a list of the medicines you take.  How can you care for yourself at home?  Wash your hands often. Always wash them before and after you treat pinkeye or touch your eyes or face.  Use moist cotton or a clean, wet cloth to remove crust. Wipe from the inside corner of the eye to the outside. Use a clean part of the cloth for each wipe.  Put cold or warm wet cloths on your eye a few times a day if the eye hurts.  Do not wear contact lenses or eye makeup until the pinkeye is gone. Throw away any eye makeup you were using when you got pinkeye. Clean your  "contacts and storage case. If you wear disposable contacts, use a new pair when your eye has cleared and it is safe to wear contacts again.  If the doctor gave you antibiotic ointment or eyedrops, use them as directed. Use the medicine for as long as instructed, even if your eye starts looking better soon. Keep the bottle tip clean, and do not let it touch the eye area.  To put in eyedrops or ointment:  Tilt your head back, and pull your lower eyelid down with one finger.  Drop or squirt the medicine inside the lower lid.  Close your eye for 30 to 60 seconds to let the drops or ointment move around.  Do not touch the ointment or dropper tip to your eyelashes or any other surface.  Do not share towels, pillows, or washcloths while you have pinkeye.  When should you call for help?   Call your doctor now or seek immediate medical care if:    You have pain in your eye, not just irritation on the surface.     You have a change in vision or loss of vision.     You have an increase in discharge from the eye.     Your eye has not started to improve or begins to get worse within 48 hours after you start using antibiotics.     Pinkeye lasts longer than 7 days.   Watch closely for changes in your health, and be sure to contact your doctor if you have any problems.  Where can you learn more?  Go to https://www.[employer].net/patiented  Enter Y392 in the search box to learn more about \"Pinkeye: Care Instructions.\"  Current as of: July 31, 2024  Content Version: 14.4    8828-5440 [employer].   Care instructions adapted under license by your healthcare professional. If you have questions about a medical condition or this instruction, always ask your healthcare professional. [employer] disclaims any warranty or liability for your use of this information.    "

## (undated) DEVICE — GLOVE BIOGEL PI MICRO SZ 7.0 48570

## (undated) DEVICE — SEAL SET MYOSURE ROD LENS SCOPE SINGLE USE 40-902

## (undated) DEVICE — SOL NACL 0.9% IRRIG 3000ML BAG 2B7477

## (undated) DEVICE — GLOVE BIOGEL PI MICRO INDICATOR UNDERGLOVE SZ 7.5 48975

## (undated) DEVICE — BASIN SET MINOR DISP

## (undated) DEVICE — SYR 10ML FINGER CONTROL W/O NDL 309695

## (undated) DEVICE — LINEN FULL SHEET 5511

## (undated) DEVICE — LINEN HALF SHEET 5512

## (undated) DEVICE — PACK MINOR LITHOTOMY RIDGES

## (undated) DEVICE — NDL SPINAL 22GA 3.5" QUINCKE 405181

## (undated) DEVICE — KIT PROCEDURE FLUENT IN/OUT FLOWPAK TISS TRAP FLT-112S

## (undated) DEVICE — Device

## (undated) DEVICE — PREP CHLORHEXIDINE 4% 4OZ (HIBICLENS) 57504

## (undated) DEVICE — PAD CHUX UNDERPAD 30X36" P3036C

## (undated) DEVICE — BAG CLEAR TRASH 1.3M 39X33" P4040C

## (undated) RX ORDER — ACETAMINOPHEN 325 MG/1
TABLET ORAL
Status: DISPENSED
Start: 2024-05-08

## (undated) RX ORDER — LIDOCAINE HYDROCHLORIDE 10 MG/ML
INJECTION, SOLUTION EPIDURAL; INFILTRATION; INTRACAUDAL; PERINEURAL
Status: DISPENSED
Start: 2024-05-08

## (undated) RX ORDER — FENTANYL CITRATE 50 UG/ML
INJECTION, SOLUTION INTRAMUSCULAR; INTRAVENOUS
Status: DISPENSED
Start: 2024-05-08